# Patient Record
Sex: FEMALE | Race: WHITE | NOT HISPANIC OR LATINO | Employment: FULL TIME | URBAN - METROPOLITAN AREA
[De-identification: names, ages, dates, MRNs, and addresses within clinical notes are randomized per-mention and may not be internally consistent; named-entity substitution may affect disease eponyms.]

---

## 2017-04-19 ENCOUNTER — ALLSCRIPTS OFFICE VISIT (OUTPATIENT)
Dept: OTHER | Facility: OTHER | Age: 57
End: 2017-04-19

## 2018-01-14 VITALS
BODY MASS INDEX: 26.46 KG/M2 | HEART RATE: 63 BPM | DIASTOLIC BLOOD PRESSURE: 76 MMHG | WEIGHT: 155 LBS | SYSTOLIC BLOOD PRESSURE: 126 MMHG | HEIGHT: 64 IN

## 2018-03-24 DIAGNOSIS — I10 ESSENTIAL HYPERTENSION: Primary | ICD-10-CM

## 2018-03-24 RX ORDER — LISINOPRIL 10 MG/1
TABLET ORAL
Qty: 90 TABLET | Refills: 3 | Status: SHIPPED | OUTPATIENT
Start: 2018-03-24 | End: 2019-03-14 | Stop reason: SDUPTHER

## 2018-06-23 DIAGNOSIS — E78.00 HYPERCHOLESTEROLEMIA: Primary | ICD-10-CM

## 2018-06-23 RX ORDER — ATORVASTATIN CALCIUM 40 MG/1
TABLET, FILM COATED ORAL
Qty: 90 TABLET | Refills: 3 | Status: SHIPPED | OUTPATIENT
Start: 2018-06-23 | End: 2019-06-12 | Stop reason: SDUPTHER

## 2018-09-04 DIAGNOSIS — I10 ESSENTIAL HYPERTENSION: Primary | ICD-10-CM

## 2018-11-06 ENCOUNTER — TRANSCRIBE ORDERS (OUTPATIENT)
Dept: ADMINISTRATIVE | Facility: HOSPITAL | Age: 58
End: 2018-11-06

## 2018-11-06 DIAGNOSIS — N63.0 BREAST LUMP: Primary | ICD-10-CM

## 2018-11-06 DIAGNOSIS — Z12.39 SCREENING BREAST EXAMINATION: ICD-10-CM

## 2018-11-14 ENCOUNTER — HOSPITAL ENCOUNTER (OUTPATIENT)
Dept: MAMMOGRAPHY | Facility: HOSPITAL | Age: 58
Discharge: HOME | End: 2018-11-14
Payer: COMMERCIAL

## 2018-11-14 DIAGNOSIS — Z12.39 SCREENING BREAST EXAMINATION: ICD-10-CM

## 2018-11-14 DIAGNOSIS — N63.0 BREAST LUMP: ICD-10-CM

## 2018-11-14 PROCEDURE — 77066 DX MAMMO INCL CAD BI: CPT

## 2018-11-14 PROCEDURE — G0279 TOMOSYNTHESIS, MAMMO: HCPCS

## 2018-11-14 PROCEDURE — 77067 SCR MAMMO BI INCL CAD: CPT

## 2018-11-19 ENCOUNTER — HOSPITAL ENCOUNTER (OUTPATIENT)
Dept: ULTRASOUND IMAGING | Facility: HOSPITAL | Age: 58
Discharge: HOME | End: 2018-11-19
Payer: COMMERCIAL

## 2018-11-19 DIAGNOSIS — Z12.39 SCREENING BREAST EXAMINATION: ICD-10-CM

## 2018-11-19 PROCEDURE — 76642 ULTRASOUND BREAST LIMITED: CPT

## 2019-03-14 DIAGNOSIS — I10 ESSENTIAL HYPERTENSION: ICD-10-CM

## 2019-03-14 RX ORDER — LISINOPRIL 10 MG/1
TABLET ORAL
Qty: 90 TABLET | Refills: 3 | Status: SHIPPED | OUTPATIENT
Start: 2019-03-14 | End: 2020-03-18 | Stop reason: SDUPTHER

## 2019-06-12 DIAGNOSIS — E78.00 HYPERCHOLESTEROLEMIA: ICD-10-CM

## 2019-06-12 RX ORDER — ATORVASTATIN CALCIUM 40 MG/1
TABLET, FILM COATED ORAL
Qty: 90 TABLET | Refills: 3 | Status: SHIPPED | OUTPATIENT
Start: 2019-06-12 | End: 2021-03-29 | Stop reason: SDUPTHER

## 2019-08-27 DIAGNOSIS — I10 ESSENTIAL HYPERTENSION: ICD-10-CM

## 2020-01-28 ENCOUNTER — TRANSCRIBE ORDERS (OUTPATIENT)
Dept: ADMINISTRATIVE | Facility: HOSPITAL | Age: 60
End: 2020-01-28

## 2020-01-28 DIAGNOSIS — Z12.31 ENCOUNTER FOR SCREENING MAMMOGRAM FOR MALIGNANT NEOPLASM OF BREAST: ICD-10-CM

## 2020-01-28 DIAGNOSIS — N61.0 BREAST INFECTION: Primary | ICD-10-CM

## 2020-03-18 ENCOUNTER — OFFICE VISIT (OUTPATIENT)
Dept: CARDIOLOGY CLINIC | Facility: CLINIC | Age: 60
End: 2020-03-18
Payer: COMMERCIAL

## 2020-03-18 VITALS
SYSTOLIC BLOOD PRESSURE: 138 MMHG | WEIGHT: 144 LBS | DIASTOLIC BLOOD PRESSURE: 80 MMHG | BODY MASS INDEX: 24.59 KG/M2 | HEART RATE: 54 BPM | HEIGHT: 64 IN

## 2020-03-18 DIAGNOSIS — E78.00 HYPERCHOLESTEROLEMIA: ICD-10-CM

## 2020-03-18 DIAGNOSIS — I10 ESSENTIAL HYPERTENSION: Primary | ICD-10-CM

## 2020-03-18 DIAGNOSIS — I25.10 CORONARY ARTERY DISEASE INVOLVING NATIVE CORONARY ARTERY OF NATIVE HEART WITHOUT ANGINA PECTORIS: ICD-10-CM

## 2020-03-18 PROCEDURE — 99213 OFFICE O/P EST LOW 20 MIN: CPT | Performed by: INTERNAL MEDICINE

## 2020-03-18 PROCEDURE — 93000 ELECTROCARDIOGRAM COMPLETE: CPT | Performed by: INTERNAL MEDICINE

## 2020-03-18 RX ORDER — LISINOPRIL 10 MG/1
10 TABLET ORAL DAILY
Qty: 90 TABLET | Refills: 3 | Status: SHIPPED | OUTPATIENT
Start: 2020-03-18 | End: 2020-11-18 | Stop reason: DRUGHIGH

## 2020-03-18 RX ORDER — PREDNISONE 1 MG/1
5 TABLET ORAL DAILY
COMMUNITY
Start: 2020-02-27

## 2020-03-18 RX ORDER — ASPIRIN 325 MG
325 TABLET ORAL
COMMUNITY
Start: 2010-11-01 | End: 2020-11-18

## 2020-03-18 RX ORDER — FOLIC ACID 1 MG/1
1000 TABLET ORAL DAILY
COMMUNITY
Start: 2020-03-10 | End: 2020-11-18

## 2020-03-18 RX ORDER — METHOTREXATE 2.5 MG/1
TABLET ORAL
COMMUNITY
End: 2020-11-18

## 2020-03-18 RX ORDER — ALPRAZOLAM 1 MG/1
TABLET ORAL
COMMUNITY
Start: 2018-10-19

## 2020-03-18 RX ORDER — METOPROLOL SUCCINATE 25 MG/1
25 TABLET, EXTENDED RELEASE ORAL DAILY
Qty: 90 TABLET | Refills: 3 | Status: SHIPPED | OUTPATIENT
Start: 2020-03-18 | End: 2021-03-07

## 2020-03-18 NOTE — PROGRESS NOTES
Cardiology Follow Up    Ramya Leroy  1960  [id number]  [facility] [address] [address] [phone]    1  Essential hypertension  POCT ECG    lisinopril (ZESTRIL) 10 mg tablet    metoprolol succinate (TOPROL-XL) 25 mg 24 hr tablet   2  Coronary artery disease involving native coronary artery of native heart without angina pectoris  metoprolol succinate (TOPROL-XL) 25 mg 24 hr tablet   3  Hypercholesterolemia           Discussion/Summary: She is stable from a cardiac standpoint  I will decrease her ASA to 81 mg daily  I stressed to her that she needs to stop vaping as just stopping smoking is not enough  No cardiac testing is ordered  RTO 1 year  Will plan on ordering a regular stress test at that time  Interval History: I have not seen her in the office since 2017  She remains active and very busy  She denies CP, SOB, palpitations  She did stop smoking but is now vaping  She has CAD with prior RCA stenting in   LDL cholesterol is 70  Patient Active Problem List   Diagnosis    Coronary artery disease involving native coronary artery of native heart without angina pectoris    Hypercholesterolemia     No past medical history on file    Social History     Socioeconomic History    Marital status: /Civil Union     Spouse name: Not on file    Number of children: Not on file    Years of education: Not on file    Highest education level: Not on file   Occupational History    Not on file   Social Needs    Financial resource strain: Not on file    Food insecurity:     Worry: Not on file     Inability: Not on file    Transportation needs:     Medical: Not on file     Non-medical: Not on file   Tobacco Use    Smoking status: Former Smoker     Types: Cigarettes     Last attempt to quit: 2019     Years since quittin 9    Smokeless tobacco: Never Used    Tobacco comment: Now vaping   Substance and Sexual Activity    Alcohol use: Not on file    Drug use: Not on file    Sexual activity: Not on file   Lifestyle    Physical activity:     Days per week: Not on file     Minutes per session: Not on file    Stress: Not on file   Relationships    Social connections:     Talks on phone: Not on file     Gets together: Not on file     Attends Congregational service: Not on file     Active member of club or organization: Not on file     Attends meetings of clubs or organizations: Not on file     Relationship status: Not on file    Intimate partner violence:     Fear of current or ex partner: Not on file     Emotionally abused: Not on file     Physically abused: Not on file     Forced sexual activity: Not on file   Other Topics Concern    Not on file   Social History Narrative    Not on file      No family history on file  No past surgical history on file      Current Outpatient Medications:     ALPRAZolam (XANAX) 1 mg tablet, alprazolam 1 mg tablet  take 1 tablet by mouth at bedtime if needed, Disp: , Rfl:     aspirin 325 mg tablet, Take 325 mg by mouth, Disp: , Rfl:     atorvastatin (LIPITOR) 40 mg tablet, TAKE 1 TABLET DAILY AS DIRECTED , Disp: 90 tablet, Rfl: 3    Cholecalciferol 125 MCG (5000 UT) capsule, cholecalciferol (vitamin D3) 1,250 mcg (50,000 unit) capsule  take 1 capsule by mouth every week, Disp: , Rfl:     folic acid (FOLVITE) 1 mg tablet, Take 1,000 mcg by mouth daily, Disp: , Rfl:     lisinopril (ZESTRIL) 10 mg tablet, Take 1 tablet (10 mg total) by mouth daily, Disp: 90 tablet, Rfl: 3    methotrexate 2 5 MG tablet, methotrexate sodium 2 5 mg tablet  TAKE 5 TABLETS BY MOUTH ONCE WEEKLY , Disp: , Rfl:     metoprolol tartrate (LOPRESSOR) 25 mg tablet, TAKE 1 TABLET DAILY, Disp: 90 tablet, Rfl: 3    predniSONE 5 mg tablet, Take 5 mg by mouth daily, Disp: , Rfl:     metoprolol succinate (TOPROL-XL) 25 mg 24 hr tablet, Take 1 tablet (25 mg total) by mouth daily, Disp: 90 tablet, Rfl: 3  No Known Allergies  Vitals:    03/18/20 1403   BP: 138/80   BP Location: Right arm   Cuff Size: Standard   Pulse: (!) 54   Weight: 65 3 kg (144 lb)   Height: 5' 4" (1 626 m)     Weight (last 2 days)     Date/Time   Weight    03/18/20 1403   65 3 (144)             Blood pressure 138/80, pulse (!) 54, height 5' 4" (1 626 m), weight 65 3 kg (144 lb)  , Body mass index is 24 72 kg/m²  Labs:  No visits with results within 6 Month(s) from this visit  Latest known visit with results is:   No results found for any previous visit  Imaging: No results found  Review of Systems:  Review of Systems   Constitutional: Negative for diaphoresis, fatigue, fever and unexpected weight change  HENT: Negative  Respiratory: Negative for cough, shortness of breath and wheezing  Cardiovascular: Negative for chest pain, palpitations and leg swelling  Gastrointestinal: Negative for abdominal pain, diarrhea and nausea  Musculoskeletal: Negative for gait problem and myalgias  Skin: Negative for rash  Neurological: Negative for dizziness and numbness  Psychiatric/Behavioral: Negative  Physical Exam:  Physical Exam   Constitutional: She is oriented to person, place, and time  She appears well-developed and well-nourished  HENT:   Head: Normocephalic and atraumatic  Eyes: Pupils are equal, round, and reactive to light  Neck: Normal range of motion  Neck supple  No JVD present  Cardiovascular: Regular rhythm, S1 normal, S2 normal and normal pulses  Pulses:       Carotid pulses are 2+ on the right side, and 2+ on the left side  Pulmonary/Chest: Effort normal and breath sounds normal  She has no wheezes  She has no rales  Abdominal: Soft  Bowel sounds are normal  There is no tenderness  Musculoskeletal: Normal range of motion  She exhibits no edema or tenderness  Neurological: She is alert and oriented to person, place, and time  She has normal reflexes   No cranial nerve deficit  Skin: Skin is warm  Psychiatric: She has a normal mood and affect

## 2020-07-25 ENCOUNTER — APPOINTMENT (OUTPATIENT)
Dept: LAB | Facility: CLINIC | Age: 60
End: 2020-07-25
Payer: COMMERCIAL

## 2020-07-25 ENCOUNTER — TRANSCRIBE ORDERS (OUTPATIENT)
Dept: URGENT CARE | Facility: CLINIC | Age: 60
End: 2020-07-25

## 2020-07-25 DIAGNOSIS — E55.9 VITAMIN D DEFICIENCY: ICD-10-CM

## 2020-07-25 DIAGNOSIS — I10 ESSENTIAL HYPERTENSION: Primary | ICD-10-CM

## 2020-07-25 DIAGNOSIS — I10 ESSENTIAL HYPERTENSION: ICD-10-CM

## 2020-07-25 LAB
25(OH)D3 SERPL-MCNC: 26.8 NG/ML (ref 30–100)
ALBUMIN SERPL BCP-MCNC: 3.2 G/DL (ref 3.5–5)
ALP SERPL-CCNC: 95 U/L (ref 46–116)
ALT SERPL W P-5'-P-CCNC: 40 U/L (ref 12–78)
ANION GAP SERPL CALCULATED.3IONS-SCNC: 5 MMOL/L (ref 4–13)
AST SERPL W P-5'-P-CCNC: 26 U/L (ref 5–45)
BILIRUB SERPL-MCNC: 0.59 MG/DL (ref 0.2–1)
BUN SERPL-MCNC: 12 MG/DL (ref 5–25)
CALCIUM SERPL-MCNC: 9.2 MG/DL (ref 8.3–10.1)
CHLORIDE SERPL-SCNC: 107 MMOL/L (ref 100–108)
CO2 SERPL-SCNC: 29 MMOL/L (ref 21–32)
CREAT SERPL-MCNC: 0.74 MG/DL (ref 0.6–1.3)
GFR SERPL CREATININE-BSD FRML MDRD: 89 ML/MIN/1.73SQ M
GLUCOSE SERPL-MCNC: 72 MG/DL (ref 65–140)
POTASSIUM SERPL-SCNC: 3.6 MMOL/L (ref 3.5–5.3)
PROT SERPL-MCNC: 7.2 G/DL (ref 6.4–8.2)
SODIUM SERPL-SCNC: 141 MMOL/L (ref 136–145)

## 2020-07-25 PROCEDURE — 80053 COMPREHEN METABOLIC PANEL: CPT

## 2020-07-25 PROCEDURE — 82306 VITAMIN D 25 HYDROXY: CPT

## 2020-07-25 PROCEDURE — 36415 COLL VENOUS BLD VENIPUNCTURE: CPT

## 2020-11-18 ENCOUNTER — OFFICE VISIT (OUTPATIENT)
Dept: CARDIOLOGY CLINIC | Facility: CLINIC | Age: 60
End: 2020-11-18
Payer: COMMERCIAL

## 2020-11-18 VITALS
WEIGHT: 142 LBS | DIASTOLIC BLOOD PRESSURE: 80 MMHG | BODY MASS INDEX: 24.24 KG/M2 | TEMPERATURE: 96.8 F | SYSTOLIC BLOOD PRESSURE: 154 MMHG | HEIGHT: 64 IN | HEART RATE: 68 BPM

## 2020-11-18 DIAGNOSIS — I25.10 CORONARY ARTERY DISEASE INVOLVING NATIVE CORONARY ARTERY OF NATIVE HEART WITHOUT ANGINA PECTORIS: Primary | ICD-10-CM

## 2020-11-18 DIAGNOSIS — E78.00 HYPERCHOLESTEROLEMIA: ICD-10-CM

## 2020-11-18 DIAGNOSIS — R00.2 PALPITATIONS: ICD-10-CM

## 2020-11-18 DIAGNOSIS — I10 ESSENTIAL HYPERTENSION: ICD-10-CM

## 2020-11-18 PROCEDURE — 93000 ELECTROCARDIOGRAM COMPLETE: CPT | Performed by: INTERNAL MEDICINE

## 2020-11-18 PROCEDURE — 99214 OFFICE O/P EST MOD 30 MIN: CPT | Performed by: INTERNAL MEDICINE

## 2020-11-18 RX ORDER — ASPIRIN 81 MG/1
81 TABLET ORAL DAILY
COMMUNITY

## 2020-11-18 RX ORDER — LISINOPRIL 20 MG/1
20 TABLET ORAL DAILY
Qty: 90 TABLET | Refills: 4 | Status: SHIPPED | OUTPATIENT
Start: 2020-11-18

## 2020-11-25 ENCOUNTER — LAB (OUTPATIENT)
Dept: LAB | Facility: CLINIC | Age: 60
End: 2020-11-25
Payer: COMMERCIAL

## 2020-11-25 ENCOUNTER — TRANSCRIBE ORDERS (OUTPATIENT)
Dept: URGENT CARE | Facility: CLINIC | Age: 60
End: 2020-11-25

## 2020-11-25 DIAGNOSIS — I10 ESSENTIAL HYPERTENSION, MALIGNANT: ICD-10-CM

## 2020-11-25 DIAGNOSIS — E55.9 AVITAMINOSIS D: ICD-10-CM

## 2020-11-25 DIAGNOSIS — E55.9 VITAMIN D DEFICIENCY: Primary | ICD-10-CM

## 2020-11-25 DIAGNOSIS — I10 ESSENTIAL HYPERTENSION: ICD-10-CM

## 2020-11-25 LAB
25(OH)D3 SERPL-MCNC: 31.9 NG/ML (ref 30–100)
ALBUMIN SERPL BCP-MCNC: 3.3 G/DL (ref 3.5–5)
ALP SERPL-CCNC: 84 U/L (ref 46–116)
ALT SERPL W P-5'-P-CCNC: 34 U/L (ref 12–78)
ANION GAP SERPL CALCULATED.3IONS-SCNC: 3 MMOL/L (ref 4–13)
AST SERPL W P-5'-P-CCNC: 23 U/L (ref 5–45)
BILIRUB SERPL-MCNC: 0.32 MG/DL (ref 0.2–1)
BUN SERPL-MCNC: 9 MG/DL (ref 5–25)
CALCIUM ALBUM COR SERPL-MCNC: 9.6 MG/DL (ref 8.3–10.1)
CALCIUM SERPL-MCNC: 9 MG/DL (ref 8.3–10.1)
CHLORIDE SERPL-SCNC: 109 MMOL/L (ref 100–108)
CO2 SERPL-SCNC: 30 MMOL/L (ref 21–32)
CREAT SERPL-MCNC: 0.89 MG/DL (ref 0.6–1.3)
ERYTHROCYTE [DISTWIDTH] IN BLOOD BY AUTOMATED COUNT: 14.2 % (ref 11.6–15.1)
GFR SERPL CREATININE-BSD FRML MDRD: 71 ML/MIN/1.73SQ M
GLUCOSE SERPL-MCNC: 68 MG/DL (ref 65–140)
HCT VFR BLD AUTO: 39.1 % (ref 34.8–46.1)
HGB BLD-MCNC: 13.5 G/DL (ref 11.5–15.4)
MCH RBC QN AUTO: 35.1 PG (ref 26.8–34.3)
MCHC RBC AUTO-ENTMCNC: 34.5 G/DL (ref 31.4–37.4)
MCV RBC AUTO: 102 FL (ref 82–98)
PLATELET # BLD AUTO: 186 THOUSANDS/UL (ref 149–390)
PMV BLD AUTO: 14 FL (ref 8.9–12.7)
POTASSIUM SERPL-SCNC: 3.7 MMOL/L (ref 3.5–5.3)
PROT SERPL-MCNC: 7.1 G/DL (ref 6.4–8.2)
RBC # BLD AUTO: 3.85 MILLION/UL (ref 3.81–5.12)
SODIUM SERPL-SCNC: 142 MMOL/L (ref 136–145)
WBC # BLD AUTO: 5.08 THOUSAND/UL (ref 4.31–10.16)

## 2020-11-25 PROCEDURE — 85027 COMPLETE CBC AUTOMATED: CPT

## 2020-11-25 PROCEDURE — 82306 VITAMIN D 25 HYDROXY: CPT

## 2020-11-25 PROCEDURE — 36415 COLL VENOUS BLD VENIPUNCTURE: CPT

## 2020-11-25 PROCEDURE — 80053 COMPREHEN METABOLIC PANEL: CPT

## 2020-11-30 ENCOUNTER — HOSPITAL ENCOUNTER (OUTPATIENT)
Dept: NON INVASIVE DIAGNOSTICS | Facility: CLINIC | Age: 60
Discharge: HOME | End: 2020-11-30
Payer: COMMERCIAL

## 2020-11-30 DIAGNOSIS — I25.10 CORONARY ARTERY DISEASE INVOLVING NATIVE CORONARY ARTERY OF NATIVE HEART WITHOUT ANGINA PECTORIS: ICD-10-CM

## 2020-11-30 DIAGNOSIS — E78.00 HYPERCHOLESTEROLEMIA: ICD-10-CM

## 2020-11-30 DIAGNOSIS — R00.2 PALPITATIONS: ICD-10-CM

## 2020-11-30 PROCEDURE — 93226 XTRNL ECG REC<48 HR SCAN A/R: CPT

## 2020-11-30 PROCEDURE — 93018 CV STRESS TEST I&R ONLY: CPT | Performed by: INTERNAL MEDICINE

## 2020-11-30 PROCEDURE — 93016 CV STRESS TEST SUPVJ ONLY: CPT | Performed by: INTERNAL MEDICINE

## 2020-11-30 PROCEDURE — 93017 CV STRESS TEST TRACING ONLY: CPT

## 2020-11-30 PROCEDURE — 93225 XTRNL ECG REC<48 HRS REC: CPT

## 2020-12-03 PROCEDURE — 93227 XTRNL ECG REC<48 HR R&I: CPT | Performed by: INTERNAL MEDICINE

## 2020-12-08 ENCOUNTER — TELEPHONE (OUTPATIENT)
Dept: CARDIOLOGY CLINIC | Facility: CLINIC | Age: 60
End: 2020-12-08

## 2020-12-14 ENCOUNTER — TELEPHONE (OUTPATIENT)
Dept: CARDIOLOGY CLINIC | Facility: CLINIC | Age: 60
End: 2020-12-14

## 2021-03-07 DIAGNOSIS — I10 ESSENTIAL HYPERTENSION: ICD-10-CM

## 2021-03-07 DIAGNOSIS — I25.10 CORONARY ARTERY DISEASE INVOLVING NATIVE CORONARY ARTERY OF NATIVE HEART WITHOUT ANGINA PECTORIS: ICD-10-CM

## 2021-03-07 RX ORDER — METOPROLOL SUCCINATE 25 MG/1
TABLET, EXTENDED RELEASE ORAL
Qty: 90 TABLET | Refills: 3 | Status: SHIPPED | OUTPATIENT
Start: 2021-03-07 | End: 2022-02-21

## 2021-03-29 DIAGNOSIS — E78.00 HYPERCHOLESTEROLEMIA: ICD-10-CM

## 2021-03-29 RX ORDER — ATORVASTATIN CALCIUM 40 MG/1
40 TABLET, FILM COATED ORAL DAILY
Qty: 90 TABLET | Refills: 3 | Status: SHIPPED | OUTPATIENT
Start: 2021-03-29

## 2021-04-08 DIAGNOSIS — Z23 ENCOUNTER FOR IMMUNIZATION: ICD-10-CM

## 2021-06-03 ENCOUNTER — HOSPITAL ENCOUNTER (EMERGENCY)
Facility: HOSPITAL | Age: 61
Discharge: HOME | End: 2021-06-03
Attending: FAMILY MEDICINE | Admitting: FAMILY MEDICINE
Payer: COMMERCIAL

## 2021-06-03 ENCOUNTER — APPOINTMENT (EMERGENCY)
Dept: RADIOLOGY | Facility: HOSPITAL | Age: 61
End: 2021-06-03
Payer: COMMERCIAL

## 2021-06-03 VITALS
RESPIRATION RATE: 17 BRPM | WEIGHT: 135 LBS | TEMPERATURE: 99.2 F | OXYGEN SATURATION: 100 % | DIASTOLIC BLOOD PRESSURE: 77 MMHG | HEIGHT: 64 IN | SYSTOLIC BLOOD PRESSURE: 159 MMHG | HEART RATE: 58 BPM | BODY MASS INDEX: 23.05 KG/M2

## 2021-06-03 DIAGNOSIS — R07.9 CHEST PAIN: Primary | ICD-10-CM

## 2021-06-03 LAB
ANION GAP SERPL CALCULATED.3IONS-SCNC: 10 MMOL/L (ref 4–13)
BASOPHILS # BLD AUTO: 0 THOUSANDS/ΜL (ref 0–0.1)
BASOPHILS NFR BLD AUTO: 0 % (ref 0–2)
BUN SERPL-MCNC: 13 MG/DL (ref 7–25)
CALCIUM SERPL-MCNC: 8.8 MG/DL (ref 8.6–10.5)
CHLORIDE SERPL-SCNC: 104 MMOL/L (ref 98–107)
CO2 SERPL-SCNC: 28 MMOL/L (ref 21–31)
CREAT SERPL-MCNC: 0.93 MG/DL (ref 0.6–1.2)
EOSINOPHIL # BLD AUTO: 0.2 THOUSAND/ΜL (ref 0–0.61)
EOSINOPHIL NFR BLD AUTO: 3 % (ref 0–5)
ERYTHROCYTE [DISTWIDTH] IN BLOOD BY AUTOMATED COUNT: 13.8 % (ref 11.5–14.5)
GFR SERPL CREATININE-BSD FRML MDRD: 67 ML/MIN/1.73SQ M
GIANT PLATELETS BLD QL SMEAR: PRESENT
GLUCOSE SERPL-MCNC: 96 MG/DL (ref 65–99)
HCT VFR BLD AUTO: 37.5 % (ref 42–47)
HGB BLD-MCNC: 12.9 G/DL (ref 12–16)
LYMPHOCYTES # BLD AUTO: 1.1 THOUSANDS/ΜL (ref 0.6–4.47)
LYMPHOCYTES NFR BLD AUTO: 23 % (ref 21–51)
MCH RBC QN AUTO: 33.3 PG (ref 26–34)
MCHC RBC AUTO-ENTMCNC: 34.4 G/DL (ref 31–37)
MCV RBC AUTO: 97 FL (ref 81–99)
MONOCYTES # BLD AUTO: 0.2 THOUSAND/ΜL (ref 0.17–1.22)
MONOCYTES NFR BLD AUTO: 4 % (ref 2–12)
NEUTROPHILS # BLD AUTO: 3.4 THOUSANDS/ΜL (ref 1.4–6.5)
NEUTS SEG NFR BLD AUTO: 70 % (ref 42–75)
PLATELET # BLD AUTO: 179 THOUSANDS/UL (ref 149–390)
PLATELET BLD QL SMEAR: ADEQUATE
PMV BLD AUTO: 11.4 FL (ref 8.6–11.7)
POTASSIUM SERPL-SCNC: 3.5 MMOL/L (ref 3.5–5.5)
RBC # BLD AUTO: 3.87 MILLION/UL (ref 3.9–5.2)
RBC MORPH BLD: NORMAL
SODIUM SERPL-SCNC: 142 MMOL/L (ref 134–143)
TROPONIN I SERPL-MCNC: <0.03 NG/ML
WBC # BLD AUTO: 4.9 THOUSAND/UL (ref 4.8–10.8)

## 2021-06-03 PROCEDURE — 99285 EMERGENCY DEPT VISIT HI MDM: CPT

## 2021-06-03 PROCEDURE — 84484 ASSAY OF TROPONIN QUANT: CPT | Performed by: PHYSICIAN ASSISTANT

## 2021-06-03 PROCEDURE — 36415 COLL VENOUS BLD VENIPUNCTURE: CPT | Performed by: PHYSICIAN ASSISTANT

## 2021-06-03 PROCEDURE — 93005 ELECTROCARDIOGRAM TRACING: CPT

## 2021-06-03 PROCEDURE — 80048 BASIC METABOLIC PNL TOTAL CA: CPT | Performed by: PHYSICIAN ASSISTANT

## 2021-06-03 PROCEDURE — 85025 COMPLETE CBC W/AUTO DIFF WBC: CPT | Performed by: PHYSICIAN ASSISTANT

## 2021-06-03 PROCEDURE — 96374 THER/PROPH/DIAG INJ IV PUSH: CPT

## 2021-06-03 PROCEDURE — 99284 EMERGENCY DEPT VISIT MOD MDM: CPT | Performed by: PHYSICIAN ASSISTANT

## 2021-06-03 PROCEDURE — 71045 X-RAY EXAM CHEST 1 VIEW: CPT

## 2021-06-03 RX ORDER — SODIUM CHLORIDE 9 MG/ML
3 INJECTION INTRAVENOUS
Status: DISCONTINUED | OUTPATIENT
Start: 2021-06-03 | End: 2021-06-03 | Stop reason: HOSPADM

## 2021-06-03 RX ORDER — KETOROLAC TROMETHAMINE 30 MG/ML
30 INJECTION, SOLUTION INTRAMUSCULAR; INTRAVENOUS ONCE
Status: COMPLETED | OUTPATIENT
Start: 2021-06-03 | End: 2021-06-03

## 2021-06-03 RX ADMIN — KETOROLAC TROMETHAMINE 30 MG: 30 INJECTION, SOLUTION INTRAMUSCULAR at 18:17

## 2021-06-03 NOTE — ED PROVIDER NOTES
History  Chief Complaint   Patient presents with    Chest Pain     left sided chest / axilla pain started this AM while pt was asleep  pain woke pt from a sleep 2-3 times  pain described as stabbin pain that comes and goes every 10 minutes or so     59-year-old female presents to the emergency department complaining of left-sided parasternal chest pains that began this morning while the patient was asleep  She states they woke her from sleep approximately 2-3 times  She describes the pain as stabbing and comes every 10 minutes or so  She does not appear to be acutely distressed  She denies diaphoresis shortness of breath lightheadedness palpitations pain that radiates nausea vomiting weakness fatigue  History of coronary artery disease requiring stent placement  Pain is not aggravated or alleviated by movement or rest   Patient is pain-free at the time of evaluation  Allergies reviewed          Prior to Admission Medications   Prescriptions Last Dose Informant Patient Reported? Taking?    ALPRAZolam (XANAX) 1 mg tablet 6/3/2021 at Unknown time Self Yes Yes   Sig: alprazolam 1 mg tablet   take 1 tablet by mouth at bedtime if needed   Cholecalciferol 125 MCG (5000 UT) capsule 6/3/2021 at Unknown time Self Yes Yes   Sig: cholecalciferol (vitamin D3) 1,250 mcg (50,000 unit) capsule   take 1 capsule by mouth every week   aspirin (ECOTRIN LOW STRENGTH) 81 mg EC tablet 6/3/2021 at Unknown time  Yes Yes   Sig: Take 81 mg by mouth daily   atorvastatin (LIPITOR) 40 mg tablet 6/3/2021 at Unknown time  No Yes   Sig: Take 1 tablet (40 mg total) by mouth daily   lisinopril (ZESTRIL) 20 mg tablet 6/3/2021 at Unknown time  No Yes   Sig: Take 1 tablet (20 mg total) by mouth daily   metoprolol succinate (TOPROL-XL) 25 mg 24 hr tablet 6/3/2021 at Unknown time  No Yes   Sig: TAKE 1 TABLET BY MOUTH EVERY DAY   predniSONE 5 mg tablet 6/3/2021 at Unknown time Self Yes Yes   Sig: Take 5 mg by mouth daily Facility-Administered Medications: None       Past Medical History:   Diagnosis Date    Arthritis     Coronary artery disease     Hypercholesteremia     Hypertension        Past Surgical History:   Procedure Laterality Date    CORONARY STENT PLACEMENT  2002       History reviewed  No pertinent family history  I have reviewed and agree with the history as documented  E-Cigarette/Vaping     E-Cigarette/Vaping Substances    Nicotine Yes     Flavoring Yes      Social History     Tobacco Use    Smoking status: Former Smoker     Types: Cigarettes     Quit date: 2019     Years since quittin 1    Smokeless tobacco: Never Used    Tobacco comment: Now vaping   Substance Use Topics    Alcohol use: Yes     Comment: socially    Drug use: Never       Review of Systems   Constitutional: Negative for chills, fatigue and fever  HENT: Negative for congestion, ear pain, rhinorrhea, sinus pressure, sneezing, sore throat and trouble swallowing  Eyes: Negative for discharge and itching  Respiratory: Negative for cough, chest tightness, shortness of breath, wheezing and stridor  Cardiovascular: Positive for chest pain  Negative for palpitations  Gastrointestinal: Negative for abdominal pain, diarrhea, nausea and vomiting  Neurological: Negative for dizziness, syncope, numbness and headaches  All other systems reviewed and are negative  Physical Exam  Physical Exam  Vitals signs and nursing note reviewed  Constitutional:       General: She is not in acute distress  Appearance: She is well-developed  She is not diaphoretic  HENT:      Head: Normocephalic and atraumatic  Right Ear: External ear normal       Left Ear: External ear normal       Nose: Nose normal    Eyes:      Conjunctiva/sclera: Conjunctivae normal       Pupils: Pupils are equal, round, and reactive to light  Neck:      Musculoskeletal: Normal range of motion     Cardiovascular:      Rate and Rhythm: Normal rate and regular rhythm  Heart sounds: Normal heart sounds  No murmur  No friction rub  No gallop  Pulmonary:      Effort: Pulmonary effort is normal  No respiratory distress  Breath sounds: Normal breath sounds  No stridor  No wheezing or rales  Abdominal:      General: Bowel sounds are normal  There is no distension  Palpations: Abdomen is soft  Tenderness: There is no abdominal tenderness  There is no guarding  Musculoskeletal: Normal range of motion  General: No tenderness  Skin:     General: Skin is warm  Capillary Refill: Capillary refill takes less than 2 seconds  Neurological:      Mental Status: She is alert and oriented to person, place, and time           Vital Signs  ED Triage Vitals   Temperature Pulse Respirations Blood Pressure SpO2   06/03/21 1630 06/03/21 1631 06/03/21 1631 06/03/21 1631 06/03/21 1631   99 2 °F (37 3 °C) 63 18 159/77 100 %      Temp Source Heart Rate Source Patient Position - Orthostatic VS BP Location FiO2 (%)   06/03/21 1630 -- -- -- --   Temporal          Pain Score       06/03/21 1630       5           Vitals:    06/03/21 1631 06/03/21 1800   BP: 159/77    Pulse: 63 58         Visual Acuity      ED Medications  Medications   sodium chloride (PF) 0 9 % injection 3 mL (has no administration in time range)   ketorolac (TORADOL) injection 30 mg (30 mg Intravenous Given 6/3/21 1817)       Diagnostic Studies  Results Reviewed     Procedure Component Value Units Date/Time    Troponin I repeat in 3hrs [[id number]]     Lab Status: No result Specimen: Blood     Troponin I [[id number]]  (Normal) Collected: 06/03/21 1701    Lab Status: Final result Specimen: Blood from Arm, Right Updated: 06/03/21 1741     Troponin I <0 03 ng/mL     Basic metabolic panel [[id number]] Collected: 06/03/21 1701    Lab Status: Final result Specimen: Blood from Arm, Right Updated: 06/03/21 1736     Sodium 142 mmol/L      Potassium 3 5 mmol/L      Chloride 104 mmol/L      CO2 28 mmol/L      ANION GAP 10 mmol/L      BUN 13 mg/dL      Creatinine 0 93 mg/dL      Glucose 96 mg/dL      Calcium 8 8 mg/dL      eGFR 67 ml/min/1 73sq m     Narrative:      [place] guidelines for Chronic Kidney Disease (CKD):     Stage 1 with normal or high GFR (GFR > 90 mL/min/1 73 square meters)    Stage 2 Mild CKD (GFR = 60-89 mL/min/1 73 square meters)    Stage 3A Moderate CKD (GFR = 45-59 mL/min/1 73 square meters)    Stage 3B Moderate CKD (GFR = 30-44 mL/min/1 73 square meters)    Stage 4 Severe CKD (GFR = 15-29 mL/min/1 73 square meters)    Stage 5 End Stage CKD (GFR <15 mL/min/1 73 square meters)  Note: GFR calculation is accurate only with a steady state creatinine    Smear Review(Phlebs Do Not Order) [[id number]] Collected: 06/03/21 1701    Lab Status: Final result Specimen: Blood from Arm, Right Updated: 06/03/21 1734     RBC Morphology Normal     Platelet Estimate Adequate     Giant PLTs Present    CBC and differential [[id number]]  (Abnormal) Collected: 06/03/21 1701    Lab Status: Final result Specimen: Blood from Arm, Right Updated: 06/03/21 1728     WBC 4 90 Thousand/uL      RBC 3 87 Million/uL      Hemoglobin 12 9 g/dL      Hematocrit 37 5 %      MCV 97 fL      MCH 33 3 pg      MCHC 34 4 g/dL      RDW 13 8 %      MPV 11 4 fL      Platelets 052 Thousands/uL      Neutrophils Relative 70 %      Lymphocytes Relative 23 %      Monocytes Relative 4 %      Eosinophils Relative 3 %      Basophils Relative 0 %      Neutrophils Absolute 3 40 Thousands/µL      Lymphocytes Absolute 1 10 Thousands/µL      Monocytes Absolute 0 20 Thousand/µL      Eosinophils Absolute 0 20 Thousand/µL      Basophils Absolute 0 00 Thousands/µL                  X-ray chest 1 view portable    (Results Pending)              Procedures  Procedures         ED Course             HEART Risk Score      Most Recent Value   Heart Score Risk Calculator   History  0 Filed at: 06/03/2021 1743   ECG  1 Filed at: 06/03/2021 1743   Age  1 Filed at: 06/03/2021 1743   Risk Factors  2 Filed at: 06/03/2021 1743   Troponin  0 Filed at: 06/03/2021 1743   HEART Score  4 Filed at: 06/03/2021 1743                                    [facility]  Number of Diagnoses or Management Options  Chest pain:   Diagnosis management comments: Benign physical examination  Initial labs are unremarkable  Nonspecific T-wave EKG  Heart score 4  Patient offered admission which she adamantly declined  Patient also adamantly declined to wait for delta troponin  Patient electing to sign out of the emergency department against medical advice  Strongly encouraged the patient to wait for complete workup however she declines  AMA paperwork was completed  I sent a tiger text message to the patient's cardiologist notifying him that she was seen in the ER for chest pain and she left prior to completion of evaluation  Patient verbalized understanding AMA process  I explained during clear language that with a limited evaluation she may still have underlying cardiac process not seen on initial evaluation  She signed [place] paperwork           Amount and/or Complexity of Data Reviewed  Clinical lab tests: ordered and reviewed  Tests in the radiology section of CPT®: reviewed and ordered    Risk of Complications, Morbidity, and/or Mortality  Presenting problems: moderate  Diagnostic procedures: low  Management options: low    Patient Progress  Patient progress: stable      Disposition  Final diagnoses:   Chest pain     Time reflects when diagnosis was documented in both MDM as applicable and the Disposition within this note     Time User Action Codes Description Comment    6/3/2021  5:59 PM Iliana Guajardo Add [R07 9] Chest pain       ED Disposition     ED Disposition Condition Date/Time Comment    FLAKO Ramirez Gurvinder 3, 2021  6:21 PM Date: 6/3/2021  Patient: Denia Mccall  Admitted: 6/3/2021  4:29 PM  Attending Provider: MD Denia Villareal or her authorized caregiver has made the decision for the patient to leave the emergency department against the advice of [facility] emergency department staff  She or her authorized caregiver has been informed and understands the inherent risks, including death, loss of quality of life, permanent disability, cardiac failure or damage, worsening pain  She or her authorized care giver has decided to accept the responsibility for this decision  [place] [facility] and all necessary parties have been advised that she may return for further evaluation or treatment  Her condition at time of discharge was stable  [place] [facility]ner had  current vital signs as follows:  /77   Pulse 63   Temp 99 2 °F (37 3 °C) (Temporal)   Resp 18   Ht 5' 4" (1 626 m)   Wt 61 2 kg (135 lb)         Follow-up Information     Follow up With Specialties Details Why Contact Info    Maribel Workman MD Cardiology   [place] [id number]  [phone]            Discharge Medication List as of 6/3/2021  6:21 PM      CONTINUE these medications which have NOT CHANGED    Details   ALPRAZolam (XANAX) 1 mg tablet alprazolam 1 mg tablet   take 1 tablet by mouth at bedtime if needed, Historical Med      aspirin (ECOTRIN LOW STRENGTH) 81 mg EC tablet Take 81 mg by mouth daily, Historical Med      atorvastatin (LIPITOR) 40 mg tablet Take 1 tablet (40 mg total) by mouth daily, Starting Mon 3/29/2021, Normal      Cholecalciferol 125 MCG (5000 UT) capsule cholecalciferol (vitamin D3) 1,250 mcg (50,000 unit) capsule   take 1 capsule by mouth every week, Historical Med      lisinopril (ZESTRIL) 20 mg tablet Take 1 tablet (20 mg total) by mouth daily, Starting Wed 11/18/2020, Normal      metoprolol succinate (TOPROL-XL) 25 mg 24 hr tablet TAKE 1 TABLET BY MOUTH EVERY DAY, Normal      predniSONE 5 mg tablet Take 5 mg by mouth daily, Starting Thu 2/27/2020, Historical Med           No discharge procedures on file      PDMP Review     None          ED Provider  Electronically Signed by           Allison Burgos PA-C  06/03/21 1933       Allison Burgos PA-C  06/03/21 1937

## 2021-06-04 LAB
ATRIAL RATE: 58 BPM
ATRIAL RATE: 59 BPM
P AXIS: 49 DEGREES
P AXIS: 56 DEGREES
PR INTERVAL: 170 MS
PR INTERVAL: 176 MS
QRS AXIS: 15 DEGREES
QRS AXIS: 2 DEGREES
QRSD INTERVAL: 88 MS
QRSD INTERVAL: 96 MS
QT INTERVAL: 430 MS
QT INTERVAL: 454 MS
QTC INTERVAL: 422 MS
QTC INTERVAL: 449 MS
T WAVE AXIS: -32 DEGREES
T WAVE AXIS: -6 DEGREES
VENTRICULAR RATE: 58 BPM
VENTRICULAR RATE: 59 BPM

## 2021-06-04 PROCEDURE — 93010 ELECTROCARDIOGRAM REPORT: CPT | Performed by: INTERNAL MEDICINE

## 2021-06-10 ENCOUNTER — TRANSCRIBE ORDERS (OUTPATIENT)
Dept: URGENT CARE | Facility: CLINIC | Age: 61
End: 2021-06-10

## 2021-06-10 ENCOUNTER — APPOINTMENT (OUTPATIENT)
Dept: LAB | Facility: CLINIC | Age: 61
End: 2021-06-10
Payer: COMMERCIAL

## 2021-06-10 DIAGNOSIS — I10 ESSENTIAL HYPERTENSION, BENIGN: ICD-10-CM

## 2021-06-10 DIAGNOSIS — E78.2 MIXED HYPERLIPIDEMIA: ICD-10-CM

## 2021-06-10 DIAGNOSIS — I10 ESSENTIAL HYPERTENSION, BENIGN: Primary | ICD-10-CM

## 2021-06-10 LAB
ALBUMIN SERPL BCP-MCNC: 3.3 G/DL (ref 3.5–5)
ALP SERPL-CCNC: 90 U/L (ref 46–116)
ALT SERPL W P-5'-P-CCNC: 36 U/L (ref 12–78)
ANION GAP SERPL CALCULATED.3IONS-SCNC: 3 MMOL/L (ref 4–13)
AST SERPL W P-5'-P-CCNC: 31 U/L (ref 5–45)
BILIRUB SERPL-MCNC: 0.44 MG/DL (ref 0.2–1)
BUN SERPL-MCNC: 11 MG/DL (ref 5–25)
CALCIUM ALBUM COR SERPL-MCNC: 9.7 MG/DL (ref 8.3–10.1)
CALCIUM SERPL-MCNC: 9.1 MG/DL (ref 8.3–10.1)
CHLORIDE SERPL-SCNC: 106 MMOL/L (ref 100–108)
CHOLEST SERPL-MCNC: 174 MG/DL (ref 50–200)
CO2 SERPL-SCNC: 31 MMOL/L (ref 21–32)
CREAT SERPL-MCNC: 0.82 MG/DL (ref 0.6–1.3)
ERYTHROCYTE [DISTWIDTH] IN BLOOD BY AUTOMATED COUNT: 14.5 % (ref 11.6–15.1)
GFR SERPL CREATININE-BSD FRML MDRD: 78 ML/MIN/1.73SQ M
GLUCOSE P FAST SERPL-MCNC: 73 MG/DL (ref 65–99)
HCT VFR BLD AUTO: 39.2 % (ref 34.8–46.1)
HDLC SERPL-MCNC: 58 MG/DL
HGB BLD-MCNC: 13.3 G/DL (ref 11.5–15.4)
LDLC SERPL CALC-MCNC: 85 MG/DL (ref 0–100)
MCH RBC QN AUTO: 34.3 PG (ref 26.8–34.3)
MCHC RBC AUTO-ENTMCNC: 33.9 G/DL (ref 31.4–37.4)
MCV RBC AUTO: 101 FL (ref 82–98)
NONHDLC SERPL-MCNC: 116 MG/DL
PLATELET # BLD AUTO: 185 THOUSANDS/UL (ref 149–390)
PMV BLD AUTO: 13.8 FL (ref 8.9–12.7)
POTASSIUM SERPL-SCNC: 3.9 MMOL/L (ref 3.5–5.3)
PROT SERPL-MCNC: 7.3 G/DL (ref 6.4–8.2)
RBC # BLD AUTO: 3.88 MILLION/UL (ref 3.81–5.12)
SODIUM SERPL-SCNC: 140 MMOL/L (ref 136–145)
TRIGL SERPL-MCNC: 157 MG/DL
WBC # BLD AUTO: 5.67 THOUSAND/UL (ref 4.31–10.16)

## 2021-06-10 PROCEDURE — 36415 COLL VENOUS BLD VENIPUNCTURE: CPT

## 2021-06-10 PROCEDURE — 85027 COMPLETE CBC AUTOMATED: CPT

## 2021-06-10 PROCEDURE — 80053 COMPREHEN METABOLIC PANEL: CPT

## 2021-06-10 PROCEDURE — 80061 LIPID PANEL: CPT

## 2021-06-22 ENCOUNTER — OFFICE VISIT (OUTPATIENT)
Dept: URGENT CARE | Facility: CLINIC | Age: 61
End: 2021-06-22
Payer: COMMERCIAL

## 2021-06-22 VITALS
TEMPERATURE: 97 F | HEIGHT: 64 IN | DIASTOLIC BLOOD PRESSURE: 75 MMHG | HEART RATE: 64 BPM | WEIGHT: 140 LBS | BODY MASS INDEX: 23.9 KG/M2 | OXYGEN SATURATION: 99 % | RESPIRATION RATE: 18 BRPM | SYSTOLIC BLOOD PRESSURE: 128 MMHG

## 2021-06-22 DIAGNOSIS — L03.114 CELLULITIS OF LEFT ELBOW: ICD-10-CM

## 2021-06-22 DIAGNOSIS — M25.422 SWELLING OF LEFT ELBOW: Primary | ICD-10-CM

## 2021-06-22 PROCEDURE — 99203 OFFICE O/P NEW LOW 30 MIN: CPT | Performed by: PHYSICIAN ASSISTANT

## 2021-06-22 RX ORDER — PREDNISONE 10 MG/1
TABLET ORAL
Qty: 18 TABLET | Refills: 0 | Status: SHIPPED | OUTPATIENT
Start: 2021-06-22

## 2021-06-22 RX ORDER — CEPHALEXIN 500 MG/1
500 CAPSULE ORAL EVERY 6 HOURS SCHEDULED
Qty: 28 CAPSULE | Refills: 0 | Status: SHIPPED | OUTPATIENT
Start: 2021-06-22 | End: 2021-06-29

## 2021-06-22 NOTE — PROGRESS NOTES
[facility]        NAME: Denice Carpenter is a 62 y o  female  : 1960    MRN: [id number]  DATE: 2021  TIME: 1:49 PM    Assessment and Plan   Swelling of left elbow [M25 422]  1  Swelling of left elbow  cephalexin (KEFLEX) 500 mg capsule    predniSONE 10 mg tablet   2  Cellulitis of left elbow  cephalexin (KEFLEX) 500 mg capsule    predniSONE 10 mg tablet         Patient Instructions     Patient Instructions     Appears to be insect bite  She has some itching but very rapid swelling and redness concern for infection  Will cover with cephalexin and prednisone taper  She should use the prednisone taper and then can go back to the 5 mg daily dosing she is on for arthritis  If worsening symptoms on antibiotics go to the emergency room  Follow up with PCP in 3-5 days  Proceed to  ER if symptoms worsen  Chief Complaint     Chief Complaint   Patient presents with    Cellulitis     Patient presents with left elbow redness and swelling after working in yard yesterday  History of Present Illness         81-year-old female presents with right upper arm and left upper arm and left elbow redness swelling and itching and pain  She was outside working in the [place] she does not recall any bites or stings  Worsening redness today  She tried hydrocortisone cream and Benadryl topical ointment  No fever  No trouble swallowing or breathing  Review of Systems   Review of Systems   Constitutional: Negative  HENT: Negative  Eyes: Negative  Respiratory: Negative  Cardiovascular: Negative  Gastrointestinal: Negative  Skin: Positive for rash           Current Medications       Current Outpatient Medications:     ALPRAZolam (XANAX) 1 mg tablet, alprazolam 1 mg tablet  take 1 tablet by mouth at bedtime if needed, Disp: , Rfl:     aspirin (ECOTRIN LOW STRENGTH) 81 mg EC tablet, Take 81 mg by mouth daily, Disp: , Rfl:     atorvastatin (LIPITOR) 40 mg tablet, Take 1 tablet (40 mg total) by mouth daily, Disp: 90 tablet, Rfl: 3    Cholecalciferol 125 MCG (5000 UT) capsule, cholecalciferol (vitamin D3) 1,250 mcg (50,000 unit) capsule  take 1 capsule by mouth every week, Disp: , Rfl:     lisinopril (ZESTRIL) 20 mg tablet, Take 1 tablet (20 mg total) by mouth daily, Disp: 90 tablet, Rfl: 4    metoprolol succinate (TOPROL-XL) 25 mg 24 hr tablet, TAKE 1 TABLET BY MOUTH EVERY DAY, Disp: 90 tablet, Rfl: 3    predniSONE 5 mg tablet, Take 5 mg by mouth daily, Disp: , Rfl:     cephalexin (KEFLEX) 500 mg capsule, Take 1 capsule (500 mg total) by mouth every 6 (six) hours for 7 days, Disp: 28 capsule, Rfl: 0    predniSONE 10 mg tablet, 3 tabs daily for 3 days, 2 tabs daily for 3 days, 1 tab daily for 3 days, Disp: 18 tablet, Rfl: 0    Current Allergies     Allergies as of 06/22/2021    (No Known Allergies)            The following portions of the patient's history were reviewed and updated as appropriate: allergies, current medications, past family history, past medical history, past social history, past surgical history and problem list      Past Medical History:   Diagnosis Date    Arthritis     Coronary artery disease     Hypercholesteremia     Hypertension        Past Surgical History:   Procedure Laterality Date    CORONARY STENT PLACEMENT  2002       No family history on file  Medications have been verified  Objective   /75   Pulse 64   Temp (!) 97 °F (36 1 °C) (Temporal)   Resp 18   Ht 5' 4" (1 626 m)   Wt 63 5 kg (140 lb)   SpO2 99%   BMI 24 03 kg/m²        Physical Exam     Physical Exam  Constitutional:       General: She is not in acute distress  Appearance: She is well-developed  HENT:      Head: Normocephalic and atraumatic  Eyes:      Conjunctiva/sclera: Conjunctivae normal       Pupils: Pupils are equal, round, and reactive to light  Cardiovascular:      Rate and Rhythm: Normal rate and regular rhythm     Pulmonary:      Effort: Pulmonary effort is normal  No respiratory distress  Breath sounds: Normal breath sounds  Musculoskeletal:      Cervical back: Normal range of motion  Comments: Left elbow full range of motion  Right shoulder full range of motion  She has no swelling over the   Olecranon  bursa  Skin:     General: Skin is warm and dry  Comments: Right upper arm medial aspect she has a 3 cm area of erythema induration warmth and tenderness  There is some edema there  No lesion or bite visible  Left distal upper posterior arm over the triceps she has erythema extending in a 6 cm  This is indurated warm and tender  There is no bite  She has some edema extending down to the elbow not involving the olecranon bursa  Neurological:      Mental Status: She is alert and oriented to person, place, and time

## 2021-06-22 NOTE — PATIENT INSTRUCTIONS
Appears to be insect bite  She has some itching but very rapid swelling and redness concern for infection  Will cover with cephalexin and prednisone taper  She should use the prednisone taper and then can go back to the 5 mg daily dosing she is on for arthritis  If worsening symptoms on antibiotics go to the emergency room

## 2021-09-20 ENCOUNTER — APPOINTMENT (OUTPATIENT)
Dept: LAB | Facility: CLINIC | Age: 61
End: 2021-09-20
Payer: COMMERCIAL

## 2021-09-20 DIAGNOSIS — E55.9 VITAMIN D DEFICIENCY: ICD-10-CM

## 2021-09-20 DIAGNOSIS — I10 HYPERTENSION, UNSPECIFIED TYPE: ICD-10-CM

## 2021-09-20 LAB
25(OH)D3 SERPL-MCNC: 21.5 NG/ML (ref 30–100)
ALBUMIN SERPL BCP-MCNC: 3.1 G/DL (ref 3.5–5)
ALP SERPL-CCNC: 88 U/L (ref 46–116)
ALT SERPL W P-5'-P-CCNC: 35 U/L (ref 12–78)
ANION GAP SERPL CALCULATED.3IONS-SCNC: 3 MMOL/L (ref 4–13)
AST SERPL W P-5'-P-CCNC: 24 U/L (ref 5–45)
BASOPHILS # BLD AUTO: 0.01 THOUSANDS/ΜL (ref 0–0.1)
BASOPHILS NFR BLD AUTO: 0 % (ref 0–1)
BILIRUB SERPL-MCNC: 0.28 MG/DL (ref 0.2–1)
BUN SERPL-MCNC: 10 MG/DL (ref 5–25)
CALCIUM ALBUM COR SERPL-MCNC: 9.3 MG/DL (ref 8.3–10.1)
CALCIUM SERPL-MCNC: 8.6 MG/DL (ref 8.3–10.1)
CHLORIDE SERPL-SCNC: 109 MMOL/L (ref 100–108)
CO2 SERPL-SCNC: 29 MMOL/L (ref 21–32)
CREAT SERPL-MCNC: 0.76 MG/DL (ref 0.6–1.3)
EOSINOPHIL # BLD AUTO: 0.21 THOUSAND/ΜL (ref 0–0.61)
EOSINOPHIL NFR BLD AUTO: 4 % (ref 0–6)
ERYTHROCYTE [DISTWIDTH] IN BLOOD BY AUTOMATED COUNT: 14.6 % (ref 11.6–15.1)
GFR SERPL CREATININE-BSD FRML MDRD: 86 ML/MIN/1.73SQ M
GLUCOSE P FAST SERPL-MCNC: 76 MG/DL (ref 65–99)
HCT VFR BLD AUTO: 39.4 % (ref 34.8–46.1)
HGB BLD-MCNC: 13.8 G/DL (ref 11.5–15.4)
IMM GRANULOCYTES # BLD AUTO: 0.02 THOUSAND/UL (ref 0–0.2)
IMM GRANULOCYTES NFR BLD AUTO: 0 % (ref 0–2)
LYMPHOCYTES # BLD AUTO: 1.95 THOUSANDS/ΜL (ref 0.6–4.47)
LYMPHOCYTES NFR BLD AUTO: 41 % (ref 14–44)
MCH RBC QN AUTO: 35.5 PG (ref 26.8–34.3)
MCHC RBC AUTO-ENTMCNC: 35 G/DL (ref 31.4–37.4)
MCV RBC AUTO: 101 FL (ref 82–98)
MONOCYTES # BLD AUTO: 0.24 THOUSAND/ΜL (ref 0.17–1.22)
MONOCYTES NFR BLD AUTO: 5 % (ref 4–12)
NEUTROPHILS # BLD AUTO: 2.39 THOUSANDS/ΜL (ref 1.85–7.62)
NEUTS SEG NFR BLD AUTO: 50 % (ref 43–75)
NRBC BLD AUTO-RTO: 0 /100 WBCS
PLATELET # BLD AUTO: 184 THOUSANDS/UL (ref 149–390)
PMV BLD AUTO: 14.1 FL (ref 8.9–12.7)
POTASSIUM SERPL-SCNC: 3.6 MMOL/L (ref 3.5–5.3)
PROT SERPL-MCNC: 7.2 G/DL (ref 6.4–8.2)
RBC # BLD AUTO: 3.89 MILLION/UL (ref 3.81–5.12)
SODIUM SERPL-SCNC: 141 MMOL/L (ref 136–145)
WBC # BLD AUTO: 4.82 THOUSAND/UL (ref 4.31–10.16)

## 2021-09-20 PROCEDURE — 36415 COLL VENOUS BLD VENIPUNCTURE: CPT

## 2021-09-20 PROCEDURE — 85025 COMPLETE CBC W/AUTO DIFF WBC: CPT

## 2021-09-20 PROCEDURE — 82306 VITAMIN D 25 HYDROXY: CPT

## 2021-09-20 PROCEDURE — 80053 COMPREHEN METABOLIC PANEL: CPT

## 2021-11-22 ENCOUNTER — APPOINTMENT (OUTPATIENT)
Dept: LAB | Facility: CLINIC | Age: 61
End: 2021-11-22
Payer: COMMERCIAL

## 2021-11-22 DIAGNOSIS — E55.9 VITAMIN D DEFICIENCY: ICD-10-CM

## 2021-11-22 LAB — 25(OH)D3 SERPL-MCNC: 48.2 NG/ML (ref 30–100)

## 2021-11-22 PROCEDURE — 36415 COLL VENOUS BLD VENIPUNCTURE: CPT

## 2021-11-22 PROCEDURE — 82306 VITAMIN D 25 HYDROXY: CPT

## 2021-12-01 ENCOUNTER — OFFICE VISIT (OUTPATIENT)
Dept: CARDIOLOGY CLINIC | Facility: CLINIC | Age: 61
End: 2021-12-01
Payer: COMMERCIAL

## 2021-12-01 VITALS
DIASTOLIC BLOOD PRESSURE: 90 MMHG | WEIGHT: 135.4 LBS | SYSTOLIC BLOOD PRESSURE: 148 MMHG | HEART RATE: 59 BPM | HEIGHT: 64 IN | OXYGEN SATURATION: 99 % | BODY MASS INDEX: 23.12 KG/M2

## 2021-12-01 DIAGNOSIS — E78.00 HYPERCHOLESTEROLEMIA: ICD-10-CM

## 2021-12-01 DIAGNOSIS — I10 ESSENTIAL HYPERTENSION: ICD-10-CM

## 2021-12-01 DIAGNOSIS — I25.10 CORONARY ARTERY DISEASE INVOLVING NATIVE CORONARY ARTERY OF NATIVE HEART WITHOUT ANGINA PECTORIS: Primary | ICD-10-CM

## 2021-12-01 PROCEDURE — 99213 OFFICE O/P EST LOW 20 MIN: CPT | Performed by: INTERNAL MEDICINE

## 2021-12-01 RX ORDER — LISINOPRIL AND HYDROCHLOROTHIAZIDE 20; 12.5 MG/1; MG/1
TABLET ORAL
COMMUNITY
Start: 2021-11-23 | End: 2021-12-01 | Stop reason: DRUGHIGH

## 2021-12-01 RX ORDER — LISINOPRIL AND HYDROCHLOROTHIAZIDE 20; 12.5 MG/1; MG/1
2 TABLET ORAL DAILY
Qty: 180 TABLET | Refills: 4 | Status: SHIPPED | OUTPATIENT
Start: 2021-12-01

## 2022-01-24 ENCOUNTER — APPOINTMENT (OUTPATIENT)
Dept: LAB | Facility: CLINIC | Age: 62
End: 2022-01-24
Payer: COMMERCIAL

## 2022-01-24 DIAGNOSIS — I10 HYPERTENSION, UNSPECIFIED TYPE: ICD-10-CM

## 2022-01-24 DIAGNOSIS — E78.5 HYPERLIPIDEMIA, UNSPECIFIED HYPERLIPIDEMIA TYPE: ICD-10-CM

## 2022-01-24 LAB
ALBUMIN SERPL BCP-MCNC: 3.3 G/DL (ref 3.5–5)
ALP SERPL-CCNC: 89 U/L (ref 46–116)
ALT SERPL W P-5'-P-CCNC: 27 U/L (ref 12–78)
ANION GAP SERPL CALCULATED.3IONS-SCNC: 3 MMOL/L (ref 4–13)
AST SERPL W P-5'-P-CCNC: 20 U/L (ref 5–45)
BILIRUB SERPL-MCNC: 1.21 MG/DL (ref 0.2–1)
BUN SERPL-MCNC: 10 MG/DL (ref 5–25)
CALCIUM ALBUM COR SERPL-MCNC: 9.9 MG/DL (ref 8.3–10.1)
CALCIUM SERPL-MCNC: 9.3 MG/DL (ref 8.3–10.1)
CHLORIDE SERPL-SCNC: 102 MMOL/L (ref 100–108)
CO2 SERPL-SCNC: 32 MMOL/L (ref 21–32)
CREAT SERPL-MCNC: 0.77 MG/DL (ref 0.6–1.3)
ERYTHROCYTE [DISTWIDTH] IN BLOOD BY AUTOMATED COUNT: 15.6 % (ref 11.6–15.1)
GFR SERPL CREATININE-BSD FRML MDRD: 84 ML/MIN/1.73SQ M
GLUCOSE P FAST SERPL-MCNC: 75 MG/DL (ref 65–99)
HCT VFR BLD AUTO: 38 % (ref 34.8–46.1)
HGB BLD-MCNC: 13.6 G/DL (ref 11.5–15.4)
MCH RBC QN AUTO: 35.4 PG (ref 26.8–34.3)
MCHC RBC AUTO-ENTMCNC: 35.8 G/DL (ref 31.4–37.4)
MCV RBC AUTO: 99 FL (ref 82–98)
PLATELET # BLD AUTO: 229 THOUSANDS/UL (ref 149–390)
PMV BLD AUTO: 13.2 FL (ref 8.9–12.7)
POTASSIUM SERPL-SCNC: 3.5 MMOL/L (ref 3.5–5.3)
PROT SERPL-MCNC: 7.3 G/DL (ref 6.4–8.2)
RBC # BLD AUTO: 3.84 MILLION/UL (ref 3.81–5.12)
SODIUM SERPL-SCNC: 137 MMOL/L (ref 136–145)
WBC # BLD AUTO: 5.32 THOUSAND/UL (ref 4.31–10.16)

## 2022-01-24 PROCEDURE — 36415 COLL VENOUS BLD VENIPUNCTURE: CPT

## 2022-01-24 PROCEDURE — 80053 COMPREHEN METABOLIC PANEL: CPT

## 2022-01-24 PROCEDURE — 85027 COMPLETE CBC AUTOMATED: CPT

## 2022-02-19 DIAGNOSIS — I25.10 CORONARY ARTERY DISEASE INVOLVING NATIVE CORONARY ARTERY OF NATIVE HEART WITHOUT ANGINA PECTORIS: ICD-10-CM

## 2022-02-19 DIAGNOSIS — I10 ESSENTIAL HYPERTENSION: ICD-10-CM

## 2022-02-21 RX ORDER — METOPROLOL SUCCINATE 25 MG/1
TABLET, EXTENDED RELEASE ORAL
Qty: 90 TABLET | Refills: 3 | Status: SHIPPED | OUTPATIENT
Start: 2022-02-21 | End: 2022-05-12 | Stop reason: SDUPTHER

## 2022-05-12 DIAGNOSIS — I10 ESSENTIAL HYPERTENSION: ICD-10-CM

## 2022-05-12 DIAGNOSIS — I25.10 CORONARY ARTERY DISEASE INVOLVING NATIVE CORONARY ARTERY OF NATIVE HEART WITHOUT ANGINA PECTORIS: ICD-10-CM

## 2022-05-12 NOTE — TELEPHONE ENCOUNTER
Requested medication(s) are due for refill today: Yes  Patient has already received a courtesy refill: No  Other reason request has been forwarded to provider:    Cardiovascular:  Beta Blockers Failed 05/12/2022 10:55 AM   Protocol Details  Valid encounter within last 6 months

## 2022-05-13 RX ORDER — METOPROLOL SUCCINATE 25 MG/1
25 TABLET, EXTENDED RELEASE ORAL DAILY
Qty: 90 TABLET | Refills: 4 | Status: SHIPPED | OUTPATIENT
Start: 2022-05-13

## 2022-06-06 ENCOUNTER — APPOINTMENT (OUTPATIENT)
Dept: LAB | Facility: CLINIC | Age: 62
End: 2022-06-06
Payer: COMMERCIAL

## 2022-06-06 DIAGNOSIS — E78.5 HYPERLIPIDEMIA, UNSPECIFIED HYPERLIPIDEMIA TYPE: ICD-10-CM

## 2022-06-06 DIAGNOSIS — E55.9 VITAMIN D DEFICIENCY: ICD-10-CM

## 2022-06-06 DIAGNOSIS — I10 HYPERTENSION, UNSPECIFIED TYPE: ICD-10-CM

## 2022-06-06 LAB
25(OH)D3 SERPL-MCNC: 41.6 NG/ML (ref 30–100)
ALBUMIN SERPL BCP-MCNC: 3.4 G/DL (ref 3.5–5)
ALP SERPL-CCNC: 91 U/L (ref 46–116)
ALT SERPL W P-5'-P-CCNC: 48 U/L (ref 12–78)
ANION GAP SERPL CALCULATED.3IONS-SCNC: 3 MMOL/L (ref 4–13)
AST SERPL W P-5'-P-CCNC: 40 U/L (ref 5–45)
BILIRUB SERPL-MCNC: 0.66 MG/DL (ref 0.2–1)
BUN SERPL-MCNC: 10 MG/DL (ref 5–25)
CALCIUM ALBUM COR SERPL-MCNC: 9.8 MG/DL (ref 8.3–10.1)
CALCIUM SERPL-MCNC: 9.3 MG/DL (ref 8.3–10.1)
CHLORIDE SERPL-SCNC: 101 MMOL/L (ref 100–108)
CHOLEST SERPL-MCNC: 158 MG/DL
CO2 SERPL-SCNC: 34 MMOL/L (ref 21–32)
CREAT SERPL-MCNC: 0.81 MG/DL (ref 0.6–1.3)
ERYTHROCYTE [DISTWIDTH] IN BLOOD BY AUTOMATED COUNT: 14.4 % (ref 11.6–15.1)
GFR SERPL CREATININE-BSD FRML MDRD: 78 ML/MIN/1.73SQ M
GLUCOSE P FAST SERPL-MCNC: 95 MG/DL (ref 65–99)
HCT VFR BLD AUTO: 38.5 % (ref 34.8–46.1)
HDLC SERPL-MCNC: 57 MG/DL
HGB BLD-MCNC: 14 G/DL (ref 11.5–15.4)
LDLC SERPL CALC-MCNC: 78 MG/DL (ref 0–100)
MCH RBC QN AUTO: 35.4 PG (ref 26.8–34.3)
MCHC RBC AUTO-ENTMCNC: 36.4 G/DL (ref 31.4–37.4)
MCV RBC AUTO: 98 FL (ref 82–98)
NONHDLC SERPL-MCNC: 101 MG/DL
PLATELET # BLD AUTO: 221 THOUSANDS/UL (ref 149–390)
PMV BLD AUTO: 13.6 FL (ref 8.9–12.7)
POTASSIUM SERPL-SCNC: 3.2 MMOL/L (ref 3.5–5.3)
PROT SERPL-MCNC: 7.7 G/DL (ref 6.4–8.2)
RBC # BLD AUTO: 3.95 MILLION/UL (ref 3.81–5.12)
SODIUM SERPL-SCNC: 138 MMOL/L (ref 136–145)
TRIGL SERPL-MCNC: 113 MG/DL
WBC # BLD AUTO: 5.07 THOUSAND/UL (ref 4.31–10.16)

## 2022-06-06 PROCEDURE — 80061 LIPID PANEL: CPT

## 2022-06-06 PROCEDURE — 80053 COMPREHEN METABOLIC PANEL: CPT

## 2022-06-06 PROCEDURE — 36415 COLL VENOUS BLD VENIPUNCTURE: CPT

## 2022-06-06 PROCEDURE — 85027 COMPLETE CBC AUTOMATED: CPT

## 2022-06-06 PROCEDURE — 82306 VITAMIN D 25 HYDROXY: CPT

## 2022-10-13 ENCOUNTER — APPOINTMENT (OUTPATIENT)
Dept: LAB | Facility: CLINIC | Age: 62
End: 2022-10-13
Payer: COMMERCIAL

## 2022-10-13 DIAGNOSIS — I10 ESSENTIAL HYPERTENSION: ICD-10-CM

## 2022-10-13 DIAGNOSIS — E55.9 VITAMIN D DEFICIENCY: ICD-10-CM

## 2022-10-13 LAB
25(OH)D3 SERPL-MCNC: 38 NG/ML (ref 30–100)
ALBUMIN SERPL BCP-MCNC: 3.1 G/DL (ref 3.5–5)
ALP SERPL-CCNC: 90 U/L (ref 46–116)
ALT SERPL W P-5'-P-CCNC: 31 U/L (ref 12–78)
ANION GAP SERPL CALCULATED.3IONS-SCNC: 2 MMOL/L (ref 4–13)
AST SERPL W P-5'-P-CCNC: 22 U/L (ref 5–45)
BILIRUB SERPL-MCNC: 0.38 MG/DL (ref 0.2–1)
BUN SERPL-MCNC: 9 MG/DL (ref 5–25)
CALCIUM ALBUM COR SERPL-MCNC: 9.7 MG/DL (ref 8.3–10.1)
CALCIUM SERPL-MCNC: 9 MG/DL (ref 8.3–10.1)
CHLORIDE SERPL-SCNC: 107 MMOL/L (ref 96–108)
CO2 SERPL-SCNC: 29 MMOL/L (ref 21–32)
CREAT SERPL-MCNC: 0.81 MG/DL (ref 0.6–1.3)
GFR SERPL CREATININE-BSD FRML MDRD: 78 ML/MIN/1.73SQ M
GLUCOSE SERPL-MCNC: 75 MG/DL (ref 65–140)
POTASSIUM SERPL-SCNC: 3.5 MMOL/L (ref 3.5–5.3)
PROT SERPL-MCNC: 6.9 G/DL (ref 6.4–8.4)
SODIUM SERPL-SCNC: 138 MMOL/L (ref 135–147)

## 2022-10-13 PROCEDURE — 82306 VITAMIN D 25 HYDROXY: CPT

## 2022-10-13 PROCEDURE — 36415 COLL VENOUS BLD VENIPUNCTURE: CPT

## 2022-10-13 PROCEDURE — 80053 COMPREHEN METABOLIC PANEL: CPT

## 2023-01-04 ENCOUNTER — OFFICE VISIT (OUTPATIENT)
Dept: CARDIOLOGY CLINIC | Facility: CLINIC | Age: 63
End: 2023-01-04

## 2023-01-04 VITALS
BODY MASS INDEX: 23.82 KG/M2 | OXYGEN SATURATION: 99 % | WEIGHT: 138.8 LBS | SYSTOLIC BLOOD PRESSURE: 126 MMHG | DIASTOLIC BLOOD PRESSURE: 90 MMHG | HEART RATE: 61 BPM

## 2023-01-04 DIAGNOSIS — I10 ESSENTIAL HYPERTENSION: ICD-10-CM

## 2023-01-04 DIAGNOSIS — I25.10 CORONARY ARTERY DISEASE INVOLVING NATIVE CORONARY ARTERY OF NATIVE HEART WITHOUT ANGINA PECTORIS: ICD-10-CM

## 2023-01-04 DIAGNOSIS — I73.9 CLAUDICATION OF RIGHT LOWER EXTREMITY (HCC): Primary | ICD-10-CM

## 2023-01-04 DIAGNOSIS — E78.00 HYPERCHOLESTEROLEMIA: ICD-10-CM

## 2023-01-04 DIAGNOSIS — Z72.0 TOBACCO ABUSE: ICD-10-CM

## 2023-01-08 DIAGNOSIS — I10 ESSENTIAL HYPERTENSION: ICD-10-CM

## 2023-01-09 PROBLEM — Z72.0 TOBACCO ABUSE: Status: ACTIVE | Noted: 2023-01-09

## 2023-01-09 NOTE — PROGRESS NOTES
Cardiology Follow Up    Clovia Cabot  1960  [id number]  [facility] [place]  [address]  [address]  [address] [address]  [phone]    1  Claudication of right lower extremity (HCC)  VAS lower limb arterial duplex, limited, unilateral      2  Coronary artery disease involving native coronary artery of native heart without angina pectoris        3  Essential hypertension        4  Hypercholesterolemia        5  Tobacco abuse              Discussion/Summary: Her cardiac status appear to be stable  I stressed to her that she must stop smoking  Her RLE calf pain sounds like claudication and I ordered a VAS lower limb arterial duplex study  She may need to see vascular  RTO 1 year  Interval History: She has not had any significant cardiac problems since her last OV  She continues to smoke 1/2 packs a day  She denies CP or significant SOB  She does describe R calf pain and tightness with activity which is now happening often  It goes away with rest     She has CAD with prior RCA stenting in 2002      EST  11/2020 - 6: 06 Clement, no ischemia    LDL 78  /90  Creatinine 0 81    Patient Active Problem List   Diagnosis   • Coronary artery disease involving native coronary artery of native heart without angina pectoris   • Hypercholesterolemia   • Essential hypertension   • Tobacco abuse     Past Medical History:   Diagnosis Date   • Arthritis    • Coronary artery disease    • Hypercholesteremia    • Hypertension      Social History     Socioeconomic History   • Marital status: /Civil Union     Spouse name: Not on file   • Number of children: Not on file   • Years of education: Not on file   • Highest education level: Not on file   Occupational History   • Not on file   Tobacco Use   • Smoking status: Former     Types: Cigarettes, E-Cigarettes     Quit date: 04/2019     Years since quitting: 3 7   • Smokeless tobacco: Never   • Tobacco comments:     Now vaping   Vaping Use   • Vaping Use: Not on file   Substance and Sexual Activity   • Alcohol use: Yes     Comment: socially   • Drug use: Never   • Sexual activity: Not on file   Other Topics Concern   • Not on file   Social History Narrative   • Not on file     Social Determinants of Health     Financial Resource Strain: Not on file   Food Insecurity: Not on file   Transportation Needs: Not on file   Physical Activity: Not on file   Stress: Not on file   Social Connections: Not on file   Intimate Partner Violence: Not on file   Housing Stability: Not on file      No family history on file    Past Surgical History:   Procedure Laterality Date   • CORONARY STENT PLACEMENT  2002       Current Outpatient Medications:   •  ALPRAZolam (XANAX) 1 mg tablet, alprazolam 1 mg tablet  take 1 tablet by mouth at bedtime if needed, Disp: , Rfl:   •  aspirin (ECOTRIN LOW STRENGTH) 81 mg EC tablet, Take 81 mg by mouth daily, Disp: , Rfl:   •  atorvastatin (LIPITOR) 40 mg tablet, Take 1 tablet (40 mg total) by mouth daily, Disp: 90 tablet, Rfl: 3  •  Cholecalciferol 125 MCG (5000 UT) capsule, cholecalciferol (vitamin D3) 1,250 mcg (50,000 unit) capsule  take 1 capsule by mouth every week, Disp: , Rfl:   •  lisinopril-hydrochlorothiazide (PRINZIDE,ZESTORETIC) 20-12 5 MG per tablet, Take 2 tablets by mouth daily, Disp: 180 tablet, Rfl: 4  •  metoprolol succinate (TOPROL-XL) 25 mg 24 hr tablet, Take 1 tablet (25 mg total) by mouth in the morning , Disp: 90 tablet, Rfl: 4  •  predniSONE 5 mg tablet, Take 5 mg by mouth daily, Disp: , Rfl:   •  lisinopril (ZESTRIL) 20 mg tablet, Take 1 tablet (20 mg total) by mouth daily (Patient not taking: Reported on 12/1/2021), Disp: 90 tablet, Rfl: 4  •  predniSONE 10 mg tablet, 3 tabs daily for 3 days, 2 tabs daily for 3 days, 1 tab daily for 3 days (Patient not taking: Reported on 12/1/2021), Disp: 18 tablet, Rfl: 0  No Known Allergies  Vitals:    01/04/23 1448 BP: 126/90   BP Location: Right arm   Patient Position: Sitting   Cuff Size: Standard   Pulse: 61   SpO2: 99%   Weight: 63 kg (138 lb 12 8 oz)     Weight (last 2 days)     None         Blood pressure 126/90, pulse 61, weight 63 kg (138 lb 12 8 oz), SpO2 99 %  , Body mass index is 23 82 kg/m²  Labs:  Appointment on 10/13/2022   Component Date Value   • Vit D, 25-Hydroxy 10/13/2022 38 0    • Sodium 10/13/2022 138    • Potassium 10/13/2022 3 5    • Chloride 10/13/2022 107    • CO2 10/13/2022 29    • ANION GAP 10/13/2022 2 (L)    • BUN 10/13/2022 9    • Creatinine 10/13/2022 0 81    • Glucose 10/13/2022 75    • Calcium 10/13/2022 9 0    • Corrected Calcium 10/13/2022 9 7    • AST 10/13/2022 22    • ALT 10/13/2022 31    • Alkaline Phosphatase 10/13/2022 90    • Total Protein 10/13/2022 6 9    • Albumin 10/13/2022 3 1 (L)    • Total Bilirubin 10/13/2022 0 38    • eGFR 10/13/2022 78      Imaging: No results found  Review of Systems:  Review of Systems   Constitutional: Negative for diaphoresis, fatigue, fever and unexpected weight change  HENT: Negative  Respiratory: Negative for cough, shortness of breath and wheezing  Cardiovascular: Negative for chest pain, palpitations and leg swelling  Gastrointestinal: Negative for abdominal pain, diarrhea and nausea  Musculoskeletal: Negative for gait problem and myalgias  Skin: Negative for rash  Neurological: Negative for dizziness and numbness  Psychiatric/Behavioral: Negative  Physical Exam:  Physical Exam  Constitutional:       Appearance: She is well-developed  HENT:      Head: Normocephalic and atraumatic  Eyes:      Pupils: Pupils are equal, round, and reactive to light  Neck:      Vascular: No JVD  Cardiovascular:      Rate and Rhythm: Regular rhythm  Pulses: Normal pulses  Carotid pulses are 2+ on the right side and 2+ on the left side       Heart sounds: S1 normal and S2 normal    Pulmonary:      Effort: Pulmonary effort is normal       Breath sounds: Normal breath sounds  No wheezing or rales  Abdominal:      General: Bowel sounds are normal       Palpations: Abdomen is soft  Tenderness: There is no abdominal tenderness  Musculoskeletal:         General: No tenderness  Normal range of motion  Cervical back: Normal range of motion and neck supple  Skin:     General: Skin is warm  Neurological:      Mental Status: She is alert and oriented to person, place, and time  Cranial Nerves: No cranial nerve deficit  Deep Tendon Reflexes: Reflexes are normal and symmetric

## 2023-01-12 ENCOUNTER — TELEPHONE (OUTPATIENT)
Dept: CARDIOLOGY CLINIC | Facility: CLINIC | Age: 63
End: 2023-01-12

## 2023-01-12 ENCOUNTER — HOSPITAL ENCOUNTER (OUTPATIENT)
Dept: NON INVASIVE DIAGNOSTICS | Facility: HOSPITAL | Age: 63
Discharge: HOME | End: 2023-01-12

## 2023-01-12 DIAGNOSIS — I73.9 CLAUDICATION OF RIGHT LOWER EXTREMITY (HCC): ICD-10-CM

## 2023-01-12 NOTE — TELEPHONE ENCOUNTER
Kenya Sheth from vascular called, and pt was there for her vas lower limb duplex arterial limited, unilateral, but she feels she would benefit from a bilaterally  Advised can TT Dr Ashok Randhawa and ask     TT back and is fine with doing bilaterally    Rolando Mccracken and advised   Verbally understood

## 2023-01-25 ENCOUNTER — TELEPHONE (OUTPATIENT)
Dept: CARDIOLOGY CLINIC | Facility: CLINIC | Age: 63
End: 2023-01-25

## 2023-01-25 DIAGNOSIS — I73.9 PVD (PERIPHERAL VASCULAR DISEASE) WITH CLAUDICATION (HCC): Primary | ICD-10-CM

## 2023-01-25 NOTE — TELEPHONE ENCOUNTER
----- Message from Sebastián Herman MD sent at 1/25/2023  1:17 PM EST -----  Please tell her that her vascular study showed blockages in her leg arteries and that I want her to see vascular  Please set up appointment as soon as possible    I will put in the referral     Thx

## 2023-01-31 RX ORDER — LISINOPRIL AND HYDROCHLOROTHIAZIDE 20; 12.5 MG/1; MG/1
TABLET ORAL
Qty: 180 TABLET | Refills: 4 | Status: SHIPPED | OUTPATIENT
Start: 2023-01-31

## 2023-02-17 ENCOUNTER — CONSULT (OUTPATIENT)
Dept: VASCULAR SURGERY | Facility: CLINIC | Age: 63
End: 2023-02-17

## 2023-02-17 VITALS
WEIGHT: 143 LBS | SYSTOLIC BLOOD PRESSURE: 148 MMHG | HEIGHT: 64 IN | DIASTOLIC BLOOD PRESSURE: 82 MMHG | BODY MASS INDEX: 24.41 KG/M2

## 2023-02-17 DIAGNOSIS — I73.9 PVD (PERIPHERAL VASCULAR DISEASE) WITH CLAUDICATION (HCC): ICD-10-CM

## 2023-02-17 RX ORDER — PANTOPRAZOLE SODIUM 40 MG/1
40 TABLET, DELAYED RELEASE ORAL DAILY
COMMUNITY
Start: 2022-12-15

## 2023-02-17 NOTE — PATIENT INSTRUCTIONS
-Educated patient on pathophysiology of peripheral arterial disease, available treatment options, and indications for treatment    -Discussed risk factor modification, including adequate glycemic and lipid control, smoking cessation, blood pressure, and lifestyle modifications    -Continue medical optimization with daily ASA 81mg and statin therapy with LDL goal <100    -Recommend starting a structured walking program 3-5 times/week for 30 minutes  Patient should walk until claudication symptoms occur, rest for 5-10 minutes, then continue to walk  Patient should increase distance each week  -Follow up in 6 months or sooner if needed  -If you start to have worsening pain with walking, rest pain, or tissue loss, please notify our office    -We will get an updated ultrasound in 6 months    -Instructed patient to call the office with questions, concerns, or new symptoms  Peripheral Artery Disease   AMBULATORY CARE:   Peripheral artery disease (PAD)  is narrow, weak, or blocked arteries  It may affect any arteries outside of your heart and brain  PAD is usually the result of a buildup of fat and cholesterol, also called plaque, along your artery walls  Inflammation, a blood clot, or abnormal cell growth could also block your arteries  PAD prevents normal blood flow to your legs and arms  You are at risk of an amputation if poor blood flow keeps wounds from healing or causes gangrene (tissue death)  Without treatment, PAD can also cause a heart attack or stroke  Common symptoms include:  Mild PAD usually does not cause symptoms   As the disease worsens over time, you may have the following:  Pain or cramps in your leg or hip while you walk    A numb, weak, or heavy feeling in your legs    Dry, scaly, red, or pale skin on your legs    Thick or brittle nails, or hair loss on your arms and legs    Foot sores that will not heal    Burning or aching in your feet and toes while resting (this may be worse when you lie down)    Call your local emergency number (911 in the [address] [facility]) if:   You have any of the following signs of a heart attack:      Squeezing, pressure, or pain in your chest    You may  also have any of the following:     Discomfort or pain in your back, neck, jaw, stomach, or arm    Shortness of breath    Nausea or vomiting    Lightheadedness or a sudden cold sweat    You have any of the following signs of a stroke:      Numbness or drooping on one side of your face     Weakness in an arm or leg    Confusion or difficulty speaking    Dizziness, a severe headache, or vision loss    Seek care immediately if:   You have sores or wounds that will not heal     You notice black or discolored skin on your arm or leg  Your skin is cool to the touch  Call your doctor if:   You have leg pain when you walk 1/8 mile (200 meters) or less, even with treatment  Your legs are red, dry, or pale, even with treatment  You have questions or concerns about your condition or care  Treatment for PAD  can help reduce your risk of a heart attack, stroke, or amputation  You may need more than one of the following:  Medicines  may be given to prevent blood clots and reduce the risk of a heart attack or stroke  You may be given medicine to help prevent your PAD from getting worse  A supervised exercise program  helps you stay active in normal daily activities  Healthcare providers will help you safely walk or do strength training exercises 3 times a week for 30 to 60 minutes  You will do this for several months, then transition to walking on your own  Angioplasty  is a procedure to open your artery so blood can flow through normally  A thin tube called a catheter is used to insert a small balloon into your artery  The balloon is inflated to open your blocked artery, and then removed  A tube called a stent may be placed in your artery to hold it open           Bypass surgery  is used to make a new connection to your artery with a vein from another part of your body, or an artificial graft  The vein or graft is attached to your artery above and below your blockage  This allows blood to flow around the blocked portion of your artery  Manage and prevent PAD:   Walk for 30 to 60 minutes at least 4 times a week  Your healthcare provider may also refer you to a supervised exercise program  The program helps increase how far you can walk without pain  It also helps you stay active in normal daily activities         Do not smoke  Nicotine and other chemicals in cigarettes and cigars can worsen PAD  They can also increase your risk for a heart attack or stroke  Ask your healthcare provider for information if you currently smoke and need help to quit  E-cigarettes or smokeless tobacco still contain nicotine  Talk to your healthcare provider before you use these products  Manage other health conditions  Take your medicines as directed and follow your healthcare provider's instructions if you have high blood pressure or high cholesterol  Perform foot care and check your blood sugar levels as directed if you have diabetes  Eat heart-healthy foods  Eat whole grains, fruits, and vegetables every day  Limit salt and high-fat foods  Ask your healthcare provider for more information on a heart healthy diet  Ask what a healthy weight is for you  Your healthcare provider can help you create a healthy weight-loss plan, if needed  Follow up with your doctor as directed:  Write down your questions so you remember to ask them during your visits  © [employer] 2022 Information is for End User's use only and may not be sold, redistributed or otherwise used for commercial purposes  The above information is an  only  It is not intended as medical advice for individual conditions or treatments  Talk to your doctor, nurse or pharmacist before following any medical regimen to see if it is safe and effective for you

## 2023-02-17 NOTE — PROGRESS NOTES
Assessment/Plan:    PVD (peripheral vascular disease) with claudication [facility])  43-year-old female, smoker, with past medical history significant for CAD s/p stent, HLD, HTN, tobacco dependence  She presents today for consultation regarding right lower extremity calf claudication after 1 block  She reports her symptoms started in July 2022  She denies rest pain or tissue loss  She denies symptoms in LLE  -KURTIS 1/12/2023  --RLE: Disease without focal stenosis  Moderate to severe tibioperoneal disease  BRANDON 0 83/36/33  --LLE: Diffuse disease with focal stenosis in proximal to distal SFA with reconstitution in the popliteal artery  Evidence of tibioperoneal disease  BRANDON 0 6 9/60/69  Exam: Bilateral lower extremities warm without cyanosis or edema  Clusters of spider and reticular veins R>L  No ischemic ulcerations  Nonpalpable DP pulses with monophasic waveforms  Palpable femoral pulses  Motor and sensory intact  Recommendations  -Educated patient on pathophysiology of peripheral arterial disease, available treatment options, and indications for treatment    -Discussed risk factor modification, including adequate glycemic and lipid control, smoking cessation, blood pressure, and lifestyle modifications    -Continue medical optimization with daily ASA 81mg and statin therapy with LDL goal <100    -Recommend starting a structured walking program 3-5 times/week for 30 minutes  Patient should walk until claudication symptoms occur, rest for 5-10 minutes, then continue to walk  Patient should increase distance each week  -We will continue surveillance with lower extremity arterial duplex in 6 months to assess for progression of disease   -Counseled on smoking cessation  Smoking 0 5 ppd    -Follow up in 6 months after KURTIS to re-evaluation and discussion on possible intervention if she can quit smoking    -Instructed patient to call the office with questions, concerns, or new symptoms            Diagnoses and all orders for this visit:    PVD (peripheral vascular disease) with claudication ([facility] [address] )  -     Ambulatory referral to Vascular Surgery  -     VAS lower limb arterial duplex, complete bilateral; Future    Other orders  -     pantoprazole (PROTONIX) 40 mg tablet; Take 40 mg by mouth daily          Subjective:      Patient ID: Rosetta Varela is a 65 y o  female  HPI  [facility] presents today for consultation regarding her right lower extremity calf tightness after walking approximately 1 block  She reports this resolves with rest   She first noticed her symptoms started last July while at [facility]  She denies symptoms of ischemic rest pain or tissue loss  Denies any symptoms in the left lower extremity  She is currently on aspirin 81 mg and atorvastatin 40 mg  She has a history of CAD and had a stent placed  She reports her symptoms are not disabling  She works on her feet throughout the day and reports no issues  She reports majority of her symptoms are with incline or strenuous exercise  Smoking a half a pack per day  She is a current smoker, smoking 0 5 ppd  The following portions of the patient's history were reviewed and updated as appropriate: allergies, current medications, past family history, past medical history, past social history, past surgical history and problem list     Review of Systems   Constitutional: Negative  HENT: Negative  Eyes: Negative  Respiratory: Negative  Cardiovascular: Negative  Gastrointestinal: Negative  Endocrine: Negative  Genitourinary: Negative  Musculoskeletal: Negative  Skin: Negative  Allergic/Immunologic: Negative  Neurological: Negative  Hematological: Negative  Psychiatric/Behavioral: Negative  I have personally reviewed and made appropriate changes to the ROS that was input by the medical assistant       Objective:        Vitals:    02/17/23 1437   BP: 148/82   BP Location: Right arm   Patient Position: Sitting Cuff Size: Standard   Weight: 64 9 kg (143 lb)   Height: 5' 4" (1 626 m)       Patient Active Problem List   Diagnosis   • Coronary artery disease involving native coronary artery of native heart without angina pectoris   • Hypercholesterolemia   • Essential hypertension   • Tobacco abuse   • PVD (peripheral vascular disease) with claudication [facility])       Past Surgical History:   Procedure Laterality Date   • CORONARY STENT PLACEMENT  2002       History reviewed  No pertinent family history      Social History     Socioeconomic History   • Marital status: /Civil Union     Spouse name: Not on file   • Number of children: Not on file   • Years of education: Not on file   • Highest education level: Not on file   Occupational History   • Not on file   Tobacco Use   • Smoking status: Every Day     Packs/day: 0 50     Types: Cigarettes, E-Cigarettes     Last attempt to quit: 04/2019     Years since quitting: 3 8   • Smokeless tobacco: Never   • Tobacco comments:     Now vaping   Vaping Use   • Vaping Use: Not on file   Substance and Sexual Activity   • Alcohol use: Yes     Comment: socially   • Drug use: Never   • Sexual activity: Not on file   Other Topics Concern   • Not on file   Social History Narrative   • Not on file     Social Determinants of Health     Financial Resource Strain: Not on file   Food Insecurity: Not on file   Transportation Needs: Not on file   Physical Activity: Not on file   Stress: Not on file   Social Connections: Not on file   Intimate Partner Violence: Not on file   Housing Stability: Not on file       No Known Allergies      Current Outpatient Medications:   •  ALPRAZolam (XANAX) 1 mg tablet, alprazolam 1 mg tablet  take 1 tablet by mouth at bedtime if needed, Disp: , Rfl:   •  aspirin (ECOTRIN LOW STRENGTH) 81 mg EC tablet, Take 81 mg by mouth daily, Disp: , Rfl:   •  atorvastatin (LIPITOR) 40 mg tablet, Take 1 tablet (40 mg total) by mouth daily, Disp: 90 tablet, Rfl: 3  • Cholecalciferol 125 MCG (5000 UT) capsule, cholecalciferol (vitamin D3) 1,250 mcg (50,000 unit) capsule  take 1 capsule by mouth every week, Disp: , Rfl:   •  lisinopril-hydrochlorothiazide (PRINZIDE,ZESTORETIC) 20-12 5 MG per tablet, TAKE 2 TABLETS BY MOUTH EVERY DAY, Disp: 180 tablet, Rfl: 4  •  metoprolol succinate (TOPROL-XL) 25 mg 24 hr tablet, Take 1 tablet (25 mg total) by mouth in the morning , Disp: 90 tablet, Rfl: 4  •  pantoprazole (PROTONIX) 40 mg tablet, Take 40 mg by mouth daily, Disp: , Rfl:   •  predniSONE 10 mg tablet, 3 tabs daily for 3 days, 2 tabs daily for 3 days, 1 tab daily for 3 days, Disp: 18 tablet, Rfl: 0  •  predniSONE 5 mg tablet, Take 5 mg by mouth daily, Disp: , Rfl:   •  lisinopril (ZESTRIL) 20 mg tablet, Take 1 tablet (20 mg total) by mouth daily (Patient not taking: Reported on 2/17/2023), Disp: 90 tablet, Rfl: 4    /82 (BP Location: Right arm, Patient Position: Sitting, Cuff Size: Standard)   Ht 5' 4" (1 626 m)   Wt 64 9 kg (143 lb)   BMI 24 55 kg/m²          Physical Exam  Vitals and nursing note reviewed  Constitutional:       Appearance: Normal appearance  HENT:      Head: Normocephalic and atraumatic  Neck:      Vascular: No carotid bruit  Cardiovascular:      Rate and Rhythm: Normal rate and regular rhythm  Pulses:           Carotid pulses are 2+ on the right side and 2+ on the left side  Radial pulses are 2+ on the right side and 2+ on the left side  Femoral pulses are 2+ on the right side and 2+ on the left side  Dorsalis pedis pulses are detected w/ Doppler on the right side and detected w/ Doppler on the left side  Heart sounds: Normal heart sounds  Comments: No carotid bruit   Pulmonary:      Effort: Pulmonary effort is normal  No respiratory distress  Breath sounds: Normal breath sounds  Abdominal:      General: Bowel sounds are normal  There is no distension  Palpations: Abdomen is soft        Comments: No abdominal bruit or pulsatile masses  Musculoskeletal:         General: No swelling or signs of injury  Normal range of motion  Cervical back: Normal range of motion and neck supple  Right lower leg: No edema  Left lower leg: No edema  Skin:     General: Skin is warm  Capillary Refill: Capillary refill takes less than 2 seconds  Findings: No erythema  Comments: Bilateral lower extremity chronic venous insufficiency R>L  Ischemic ulcerations, cyanosis, edema  Neurological:      General: No focal deficit present  Mental Status: She is alert and oriented to person, place, and time  Psychiatric:         Mood and Affect: Mood normal          Behavior: Behavior normal        Kary Aguilar PA-C  [facility]  [phone]    This text is generated with voice recognition software  There may be translation, syntax, or grammatical errors  If have any questions, please contact the dictating provider

## 2023-02-17 NOTE — ASSESSMENT & PLAN NOTE
69-year-old female, smoker, with past medical history significant for CAD s/p stent, HLD, HTN, tobacco dependence  She presents today for consultation regarding right lower extremity calf claudication after 0 5 miles  She reports her symptoms started in July 2022  She denies rest pain or tissue loss  -KURTIS 1/12/2023  --RLE: Disease without focal stenosis  Moderate to severe tibioperoneal disease  BRANDON 0 83/36/33  --LLE: Diffuse disease with focal stenosis in proximal to distal SFA with reconstitution in the popliteal artery  Evidence of tibioperoneal disease  BRANDON 0 6 9/60/69  Exam: Bilateral lower extremities warm without cyanosis or edema  No ischemic ulcerations  Nonpalpable DP pulses with monophasic waveforms  Palpable femoral pulses  Motor and sensory intact  Recommendations  -Educated patient on pathophysiology of peripheral arterial disease, available treatment options, and indications for treatment    -Discussed risk factor modification, including adequate glycemic and lipid control, smoking cessation, blood pressure, and lifestyle modifications    -Continue medical optimization with daily ASA 81mg and statin therapy with LDL goal <100    -Recommend starting a structured walking program 3-5 times/week for 30 minutes  Patient should walk until claudication symptoms occur, rest for 5-10 minutes, then continue to walk  Patient should increase distance each week  -We will continue surveillance with lower extremity arterial duplex in 6 months to assess for progression of disease   -Counseled on smoking cessation  Smoking 0 5 ppd    -Follow up in 6 months after KURTIS to re-evaluation and discussion on possible intervention if she can quit smoking    -Instructed patient to call the office with questions, concerns, or new symptoms

## 2023-04-26 ENCOUNTER — APPOINTMENT (OUTPATIENT)
Dept: LAB | Facility: CLINIC | Age: 63
End: 2023-04-26

## 2023-04-26 DIAGNOSIS — I10 HYPERTENSION, UNSPECIFIED TYPE: ICD-10-CM

## 2023-04-26 DIAGNOSIS — E55.9 VITAMIN D DEFICIENCY: ICD-10-CM

## 2023-04-26 DIAGNOSIS — E78.5 HYPERLIPIDEMIA, UNSPECIFIED HYPERLIPIDEMIA TYPE: ICD-10-CM

## 2023-04-26 LAB
25(OH)D3 SERPL-MCNC: 16.1 NG/ML (ref 30–100)
ALBUMIN SERPL BCP-MCNC: 3.5 G/DL (ref 3.5–5)
ALP SERPL-CCNC: 87 U/L (ref 46–116)
ALT SERPL W P-5'-P-CCNC: 42 U/L (ref 12–78)
ANION GAP SERPL CALCULATED.3IONS-SCNC: 5 MMOL/L (ref 4–13)
AST SERPL W P-5'-P-CCNC: 40 U/L (ref 5–45)
BASOPHILS # BLD AUTO: 0.02 THOUSANDS/ΜL (ref 0–0.1)
BASOPHILS NFR BLD AUTO: 0 % (ref 0–1)
BILIRUB SERPL-MCNC: 0.47 MG/DL (ref 0.2–1)
BUN SERPL-MCNC: 8 MG/DL (ref 5–25)
CALCIUM SERPL-MCNC: 9.4 MG/DL (ref 8.3–10.1)
CHLORIDE SERPL-SCNC: 103 MMOL/L (ref 96–108)
CHOLEST SERPL-MCNC: 148 MG/DL
CO2 SERPL-SCNC: 29 MMOL/L (ref 21–32)
CREAT SERPL-MCNC: 0.89 MG/DL (ref 0.6–1.3)
EOSINOPHIL # BLD AUTO: 0.29 THOUSAND/ΜL (ref 0–0.61)
EOSINOPHIL NFR BLD AUTO: 6 % (ref 0–6)
ERYTHROCYTE [DISTWIDTH] IN BLOOD BY AUTOMATED COUNT: 13.6 % (ref 11.6–15.1)
GFR SERPL CREATININE-BSD FRML MDRD: 69 ML/MIN/1.73SQ M
GLUCOSE P FAST SERPL-MCNC: 86 MG/DL (ref 65–99)
HCT VFR BLD AUTO: 41.2 % (ref 34.8–46.1)
HDLC SERPL-MCNC: 49 MG/DL
HGB BLD-MCNC: 14.3 G/DL (ref 11.5–15.4)
IMM GRANULOCYTES # BLD AUTO: 0.03 THOUSAND/UL (ref 0–0.2)
IMM GRANULOCYTES NFR BLD AUTO: 1 % (ref 0–2)
LDLC SERPL CALC-MCNC: 67 MG/DL (ref 0–100)
LYMPHOCYTES # BLD AUTO: 1.41 THOUSANDS/ΜL (ref 0.6–4.47)
LYMPHOCYTES NFR BLD AUTO: 28 % (ref 14–44)
MCH RBC QN AUTO: 33.5 PG (ref 26.8–34.3)
MCHC RBC AUTO-ENTMCNC: 34.7 G/DL (ref 31.4–37.4)
MCV RBC AUTO: 97 FL (ref 82–98)
MONOCYTES # BLD AUTO: 0.23 THOUSAND/ΜL (ref 0.17–1.22)
MONOCYTES NFR BLD AUTO: 5 % (ref 4–12)
NEUTROPHILS # BLD AUTO: 3.08 THOUSANDS/ΜL (ref 1.85–7.62)
NEUTS SEG NFR BLD AUTO: 60 % (ref 43–75)
NONHDLC SERPL-MCNC: 99 MG/DL
NRBC BLD AUTO-RTO: 0 /100 WBCS
PLATELET # BLD AUTO: 231 THOUSANDS/UL (ref 149–390)
PMV BLD AUTO: 13.9 FL (ref 8.9–12.7)
POTASSIUM SERPL-SCNC: 4.4 MMOL/L (ref 3.5–5.3)
PROT SERPL-MCNC: 7.3 G/DL (ref 6.4–8.4)
RBC # BLD AUTO: 4.27 MILLION/UL (ref 3.81–5.12)
SODIUM SERPL-SCNC: 137 MMOL/L (ref 135–147)
TRIGL SERPL-MCNC: 162 MG/DL
WBC # BLD AUTO: 5.06 THOUSAND/UL (ref 4.31–10.16)

## 2023-08-03 DIAGNOSIS — I25.10 CORONARY ARTERY DISEASE INVOLVING NATIVE CORONARY ARTERY OF NATIVE HEART WITHOUT ANGINA PECTORIS: ICD-10-CM

## 2023-08-03 DIAGNOSIS — I10 ESSENTIAL HYPERTENSION: ICD-10-CM

## 2023-08-03 RX ORDER — METOPROLOL SUCCINATE 25 MG/1
25 TABLET, EXTENDED RELEASE ORAL DAILY
Qty: 90 TABLET | Refills: 4 | Status: SHIPPED | OUTPATIENT
Start: 2023-08-03

## 2023-08-08 ENCOUNTER — APPOINTMENT (OUTPATIENT)
Dept: LAB | Facility: CLINIC | Age: 63
End: 2023-08-08
Payer: COMMERCIAL

## 2023-08-08 DIAGNOSIS — E55.9 VITAMIN D DEFICIENCY: ICD-10-CM

## 2023-08-08 DIAGNOSIS — I10 ESSENTIAL HYPERTENSION: ICD-10-CM

## 2023-08-08 LAB
ALBUMIN SERPL BCP-MCNC: 2.9 G/DL (ref 3.5–5)
ALP SERPL-CCNC: 89 U/L (ref 46–116)
ALT SERPL W P-5'-P-CCNC: 35 U/L (ref 12–78)
ANION GAP SERPL CALCULATED.3IONS-SCNC: 5 MMOL/L
AST SERPL W P-5'-P-CCNC: 29 U/L (ref 5–45)
BILIRUB SERPL-MCNC: 0.39 MG/DL (ref 0.2–1)
BUN SERPL-MCNC: 6 MG/DL (ref 5–25)
CALCIUM ALBUM COR SERPL-MCNC: 9.9 MG/DL (ref 8.3–10.1)
CALCIUM SERPL-MCNC: 9 MG/DL (ref 8.3–10.1)
CHLORIDE SERPL-SCNC: 102 MMOL/L (ref 96–108)
CO2 SERPL-SCNC: 31 MMOL/L (ref 21–32)
CREAT SERPL-MCNC: 0.85 MG/DL (ref 0.6–1.3)
GFR SERPL CREATININE-BSD FRML MDRD: 73 ML/MIN/1.73SQ M
GLUCOSE P FAST SERPL-MCNC: 81 MG/DL (ref 65–99)
POTASSIUM SERPL-SCNC: 3.4 MMOL/L (ref 3.5–5.3)
PROT SERPL-MCNC: 6.8 G/DL (ref 6.4–8.4)
SODIUM SERPL-SCNC: 138 MMOL/L (ref 135–147)

## 2023-08-08 PROCEDURE — 82306 VITAMIN D 25 HYDROXY: CPT

## 2023-08-08 PROCEDURE — 80053 COMPREHEN METABOLIC PANEL: CPT

## 2023-08-08 PROCEDURE — 36415 COLL VENOUS BLD VENIPUNCTURE: CPT

## 2023-08-09 LAB — 25(OH)D3 SERPL-MCNC: 24 NG/ML (ref 30–100)

## 2023-08-31 ENCOUNTER — HOSPITAL ENCOUNTER (OUTPATIENT)
Dept: NON INVASIVE DIAGNOSTICS | Facility: HOSPITAL | Age: 63
Discharge: HOME | End: 2023-08-31
Payer: COMMERCIAL

## 2023-08-31 DIAGNOSIS — I73.9 PVD (PERIPHERAL VASCULAR DISEASE) WITH CLAUDICATION (HCC): ICD-10-CM

## 2023-08-31 PROCEDURE — 93923 UPR/LXTR ART STDY 3+ LVLS: CPT | Performed by: SURGERY

## 2023-08-31 PROCEDURE — 93925 LOWER EXTREMITY STUDY: CPT

## 2023-08-31 PROCEDURE — 93923 UPR/LXTR ART STDY 3+ LVLS: CPT

## 2023-08-31 PROCEDURE — 93925 LOWER EXTREMITY STUDY: CPT | Performed by: SURGERY

## 2023-09-12 NOTE — ASSESSMENT & PLAN NOTE
56-year-old female, smoker, with past medical history significant for CAD s/p stent, HLD, HTN, tobacco dependence. She presents today for results review and 6-month follow-up on right lower extremity calf claudication after 0.5 miles. She reports her symptoms started in July 2022. She denies rest pain or tissue loss. -KURTIS 8/31/23  --RLE: Disease disease with >75% mid SFA stenosis with monophasic waveforms in CFA. Evidence of tibioperoneal disease. BRANDON 0.61/55/54 (prior 0.83/36/33)  --LLE: Diffuse disease with focal stenosis in proximal to mid SFA with reconstitution in the popliteal artery. Evidence of tibioperoneal disease. BRANDON 0.63/47/39 (Prior 0.69/60/69). Exam: Bilateral lower extremities warm without cyanosis or edema. No ischemic ulcerations. Nonpalpable DP pulses with monophasic waveforms. Palpable femoral pulses. Motor and sensory intact. Recommendations  -Educated patient on pathophysiology of peripheral arterial disease, available treatment options, and indications for treatment. -KURTIS and AOIL. -Discussed risk factor modification, including adequate glycemic and lipid control, smoking cessation, blood pressure, and lifestyle modifications.   -Continue medical optimization with daily ASA 81mg and statin therapy with LDL goal <100.   -Recommend starting a structured walking program 3-5 times/week for 30 minutes. Patient should walk until claudication symptoms occur, rest for 5-10 minutes, then continue to walk. Patient should increase distance each week. -Counseled on smoking cessation. Smoking 0.5 ppd.   -Follow up in 6 months after KURTIS to re-evaluation and discussion on possible intervention if she can quit smoking.   -Instructed patient to call the office with questions, concerns, or new symptoms.

## 2023-09-15 ENCOUNTER — OFFICE VISIT (OUTPATIENT)
Dept: VASCULAR SURGERY | Facility: CLINIC | Age: 63
End: 2023-09-15
Payer: COMMERCIAL

## 2023-09-15 VITALS
BODY MASS INDEX: 23.22 KG/M2 | WEIGHT: 136 LBS | SYSTOLIC BLOOD PRESSURE: 130 MMHG | HEART RATE: 72 BPM | HEIGHT: 64 IN | DIASTOLIC BLOOD PRESSURE: 76 MMHG

## 2023-09-15 DIAGNOSIS — I73.9 PVD (PERIPHERAL VASCULAR DISEASE) WITH CLAUDICATION (HCC): Primary | ICD-10-CM

## 2023-09-15 PROCEDURE — 99213 OFFICE O/P EST LOW 20 MIN: CPT

## 2023-09-15 NOTE — PROGRESS NOTES
Assessment/Plan:    PVD (peripheral vascular disease) with claudication [facility])  81-year-old female, smoker, with past medical history significant for CAD s/p stent, HLD, HTN, tobacco dependence. She presents today for results review and 6-month follow-up on right lower extremity calf claudication after 0.5 miles. She reports her symptoms started in July 2022 and have remained stable. She denies rest pain or tissue loss. -KURTIS 8/31/23  --RLE: Disease disease with >75% mid SFA stenosis with monophasic waveforms in CFA. Evidence of tibioperoneal disease. BRANDON 0.61/55/54 (prior 0.83/36/33)  --LLE: Diffuse disease with focal stenosis in proximal to mid SFA with reconstitution in the popliteal artery. Evidence of tibioperoneal disease. BRANDON 0.63/47/39 (Prior 0.69/60/69). Exam: Bilateral lower extremities warm without cyanosis or edema. No ischemic ulcerations. Multiple scattered spider and reticular veins across bilateral lower leg R>LLE. Nonpalpable DP pulses with monophasic waveforms. 1+ R femoral; 2+ L femoral pulses. Motor and sensory grossly intact. Recommendations  -Educated patient on pathophysiology of peripheral arterial disease, available treatment options, and indications for treatment.   -Obtain KURTIS and AOIL in 6 months.   -Discussed risk factor modification, including adequate glycemic and lipid control, smoking cessation, blood pressure, and lifestyle modifications.   -Continue medical optimization with daily ASA 81mg and statin therapy with LDL goal <100.   -Recommend starting a structured walking program 3-5 times/week for 30 minutes. Patient should walk until claudication symptoms occur, rest for 5-10 minutes, then continue to walk. Patient should increase distance each week. -Counseled on smoking cessation. Smoking 1ppd. (up from 0.5 ppd at last OV). -Instructed patient to call the office with questions, concerns, or new symptoms.           Diagnoses and all orders for this visit:    ROSY (peripheral vascular disease) with claudication (HCC)  -     VAS abdominal aorta/iliacs; complete study; Future  -     VAS lower limb arterial duplex, complete bilateral; Future          Subjective:      Patient ID: Macario Hutson is a 59 y.o. female. HPI  78-year-old female, smoker, with past medical history significant for CAD s/p stent, HLD, HTN, tobacco dependence. She presents today for results review and 6-month follow-up on right lower extremity calf claudication after 0.5 miles. She reports her symptoms started in July 2022 and have remained stable. She denies rest pain or tissue loss. She is taking aspirin 81 mg and atorvastatin 40 mg. Her LDL is well controlled at 67. She states she is smoking 1 ppd (up from 0.5 ppd 6 months ago). She reports calf cramping after approximately 0.5 blocks. More common up inclines or with increased exertion. She is able to carry out her normal activities and is not significantly limited by her claudication. The following portions of the patient's history were reviewed and updated as appropriate: allergies, current medications, past family history, past medical history, past social history, past surgical history and problem list.    Review of Systems   Constitutional: Negative. HENT: Negative. Eyes: Negative. Respiratory: Negative. Cardiovascular: Negative. Gastrointestinal: Negative. Endocrine: Negative. Genitourinary: Negative. Musculoskeletal: Negative. Skin: Negative. Allergic/Immunologic: Negative. Neurological: Negative. Hematological: Negative. Psychiatric/Behavioral: Negative. I have personally reviewed and made appropriate changes to the ROS that was input by the medical assistant.        Objective:      Vitals:    09/15/23 1348   BP: 130/76   BP Location: Left arm   Patient Position: Sitting   Cuff Size: Standard   Pulse: 72   Weight: 61.7 kg (136 lb)   Height: 5' 4" (1.626 m)       Patient Active Problem List Diagnosis   • Coronary artery disease involving native coronary artery of native heart without angina pectoris   • Hypercholesterolemia   • Essential hypertension   • Tobacco abuse   • PVD (peripheral vascular disease) with claudication [facility])       Past Surgical History:   Procedure Laterality Date   • CORONARY STENT PLACEMENT         No family history on file.     Social History     Socioeconomic History   • Marital status: /Civil Union     Spouse name: Not on file   • Number of children: Not on file   • Years of education: Not on file   • Highest education level: Not on file   Occupational History   • Not on file   Tobacco Use   • Smoking status: Every Day     Packs/day: 0.50     Types: Cigarettes, E-Cigarettes     Last attempt to quit: 2019     Years since quittin.4   • Smokeless tobacco: Never   • Tobacco comments:     Now vaping   Vaping Use   • Vaping Use: Not on file   Substance and Sexual Activity   • Alcohol use: Yes     Comment: socially   • Drug use: Never   • Sexual activity: Not on file   Other Topics Concern   • Not on file   Social History Narrative   • Not on file     Social Determinants of Health     Financial Resource Strain: Not on file   Food Insecurity: Not on file   Transportation Needs: Not on file   Physical Activity: Not on file   Stress: Not on file   Social Connections: Not on file   Intimate Partner Violence: Not on file   Housing Stability: Not on file       No Known Allergies      Current Outpatient Medications:   •  ALPRAZolam (XANAX) 1 mg tablet, alprazolam 1 mg tablet  take 1 tablet by mouth at bedtime if needed, Disp: , Rfl:   •  aspirin (ECOTRIN LOW STRENGTH) 81 mg EC tablet, Take 81 mg by mouth daily, Disp: , Rfl:   •  atorvastatin (LIPITOR) 40 mg tablet, Take 1 tablet (40 mg total) by mouth daily, Disp: 90 tablet, Rfl: 3  •  Cholecalciferol 125 MCG (5000 UT) capsule, cholecalciferol (vitamin D3) 1,250 mcg (50,000 unit) capsule  take 1 capsule by mouth every week, Disp: , Rfl:   •  lisinopril-hydrochlorothiazide (PRINZIDE,ZESTORETIC) 20-12.5 MG per tablet, TAKE 2 TABLETS BY MOUTH EVERY DAY, Disp: 180 tablet, Rfl: 4  •  metoprolol succinate (TOPROL-XL) 25 mg 24 hr tablet, TAKE 1 TABLET (25 MG TOTAL) BY MOUTH IN THE MORNING., Disp: 90 tablet, Rfl: 4  •  pantoprazole (PROTONIX) 40 mg tablet, Take 40 mg by mouth daily, Disp: , Rfl:   •  predniSONE 10 mg tablet, 3 tabs daily for 3 days, 2 tabs daily for 3 days, 1 tab daily for 3 days, Disp: 18 tablet, Rfl: 0  •  predniSONE 5 mg tablet, Take 5 mg by mouth daily, Disp: , Rfl:   •  lisinopril (ZESTRIL) 20 mg tablet, Take 1 tablet (20 mg total) by mouth daily (Patient not taking: Reported on 2/17/2023), Disp: 90 tablet, Rfl: 4      /76 (BP Location: Left arm, Patient Position: Sitting, Cuff Size: Standard)   Pulse 72   Ht 5' 4" (1.626 m)   Wt 61.7 kg (136 lb)   BMI 23.34 kg/m²          Physical Exam  Vitals and nursing note reviewed. Constitutional:       Appearance: Normal appearance. HENT:      Head: Normocephalic and atraumatic. Neck:      Vascular: Carotid bruit present. Cardiovascular:      Rate and Rhythm: Normal rate and regular rhythm. Pulses:           Carotid pulses are 2+ on the right side with bruit and 2+ on the left side. Radial pulses are 2+ on the right side and 2+ on the left side. Femoral pulses are 1+ on the right side and 2+ on the left side. Dorsalis pedis pulses are detected w/ Doppler on the right side and detected w/ Doppler on the left side. Posterior tibial pulses are detected w/ Doppler on the right side and detected w/ Doppler on the left side. Heart sounds: Normal heart sounds. Pulmonary:      Effort: Pulmonary effort is normal. No respiratory distress. Breath sounds: Normal breath sounds. Abdominal:      General: Bowel sounds are normal. There is no distension. Palpations: Abdomen is soft.       Comments: No abdominal bruit or pulsatile masses. Musculoskeletal:         General: No swelling. Normal range of motion. Cervical back: Normal range of motion and neck supple. Right lower leg: No edema. Left lower leg: No edema. Skin:     General: Skin is warm. Capillary Refill: Capillary refill takes 2 to 3 seconds. Findings: No erythema. Neurological:      General: No focal deficit present. Mental Status: She is alert and oriented to person, place, and time. Psychiatric:         Mood and Affect: Mood normal.         Behavior: Behavior normal.       Светлана Tenorio PA-C  [facility]  [phone]    I have spent a total time of 25 minutes on 09/15/23 in caring for this patient including Diagnostic results, Prognosis, Risks and benefits of tx options, Instructions for management, Patient and family education, Importance of tx compliance, Risk factor reductions, Impressions, Counseling / Coordination of care, Documenting in the medical record, Reviewing / ordering tests, medicine, procedures   and Obtaining or reviewing history  . This text is generated with voice recognition software. There may be translation, syntax, or grammatical errors. If have any questions, please contact the dictating provider.

## 2023-09-15 NOTE — PATIENT INSTRUCTIONS
Peripheral arterial disease (PAD)     -Educated patient on pathophysiology of peripheral arterial disease, available treatment options, and indications for treatment. -KURTIS/AOIL in 6 month follow up   -Discussed risk factor modification, including adequate glycemic and lipid control, smoking cessation, blood pressure, and lifestyle modifications.   -Continue medical optimization with daily ASA 81mg and statin therapy with LDL goal <100.   -Recommend starting a structured walking program 3-5 times/week for 30 minutes. Patient should walk until claudication symptoms occur, rest for 5-10 minutes, then continue to walk. Patient should increase distance each week. -Follow up in 6 months   -Instructed patient to call the office with questions, concerns, or new symptoms. Varicose veins  -Recommend conservative measures with use of daily compression hose (tubigrip size E), lower extremity elevation, regular exercise, and diligent skin care. -If the compression stocking causes increased discomfort, please take off and continue periodic leg elevation. Peripheral Artery Disease   AMBULATORY CARE:   Peripheral artery disease (PAD)  is narrow, weak, or blocked arteries. It may affect any arteries outside of your heart and brain. PAD is usually the result of a buildup of fat and cholesterol, also called plaque, along your artery walls. Inflammation, a blood clot, or abnormal cell growth could also block your arteries. PAD prevents normal blood flow to your legs and arms. You are at risk of an amputation if poor blood flow keeps wounds from healing or causes gangrene (tissue death). Without treatment, PAD can also cause a heart attack or stroke. Common symptoms include:  Mild PAD usually does not cause symptoms.  As the disease worsens over time, you may have the following:  Pain or cramps in your leg or hip while you walk    A numb, weak, or heavy feeling in your legs    Dry, scaly, red, or pale skin on your legs    Thick or brittle nails, or hair loss on your arms and legs    Foot sores that will not heal    Burning or aching in your feet and toes while resting (this may be worse when you lie down)    Call your local emergency number (911 in the [address]) if:   You have any of the following signs of a heart attack:      Squeezing, pressure, or pain in your chest    You may  also have any of the following:     Discomfort or pain in your back, neck, jaw, stomach, or arm    Shortness of breath    Nausea or vomiting    Lightheadedness or a sudden cold sweat    You have any of the following signs of a stroke:      Numbness or drooping on one side of your face     Weakness in an arm or leg    Confusion or difficulty speaking    Dizziness, a severe headache, or vision loss    Seek care immediately if:   You have sores or wounds that will not heal.    You notice black or discolored skin on your arm or leg. Your skin is cool to the touch. Call your doctor if:   You have leg pain when you walk 1/8 mile (200 meters) or less, even with treatment. Your legs are red, dry, or pale, even with treatment. You have questions or concerns about your condition or care. Treatment for PAD  can help reduce your risk of a heart attack, stroke, or amputation. You may need more than one of the following:  Medicines  may be given to prevent blood clots and reduce the risk of a heart attack or stroke. You may be given medicine to help prevent your PAD from getting worse. A supervised exercise program  helps you stay active in normal daily activities. Healthcare providers will help you safely walk or do strength training exercises 3 times a week for 30 to 60 minutes. You will do this for several months, then transition to walking on your own. Angioplasty  is a procedure to open your artery so blood can flow through normally. A thin tube called a catheter is used to insert a small balloon into your artery.  The balloon is inflated to open your blocked artery, and then removed. A tube called a stent may be placed in your artery to hold it open. Bypass surgery  is used to make a new connection to your artery with a vein from another part of your body, or an artificial graft. The vein or graft is attached to your artery above and below your blockage. This allows blood to flow around the blocked portion of your artery. Manage and prevent PAD:   Walk for 30 to 60 minutes at least 4 times a week. Your healthcare provider may also refer you to a supervised exercise program. The program helps increase how far you can walk without pain. It also helps you stay active in normal daily activities         Do not smoke. Nicotine and other chemicals in cigarettes and cigars can worsen PAD. They can also increase your risk for a heart attack or stroke. Ask your healthcare provider for information if you currently smoke and need help to quit. E-cigarettes or smokeless tobacco still contain nicotine. Talk to your healthcare provider before you use these products. Manage other health conditions. Take your medicines as directed and follow your healthcare provider's instructions if you have high blood pressure or high cholesterol. Perform foot care and check your blood sugar levels as directed if you have diabetes. Eat heart-healthy foods. Eat whole grains, fruits, and vegetables every day. Limit salt and high-fat foods. Ask your healthcare provider for more information on a heart healthy diet. Ask what a healthy weight is for you. Your healthcare provider can help you create a healthy weight-loss plan, if needed. Follow up with your doctor as directed:  Write down your questions so you remember to ask them during your visits. © Copyright Sondra Bashir 2022 Information is for End User's use only and may not be sold, redistributed or otherwise used for commercial purposes. The above information is an  only.  It is not intended as medical advice for individual conditions or treatments. Talk to your doctor, nurse or pharmacist before following any medical regimen to see if it is safe and effective for you.

## 2023-09-19 ENCOUNTER — APPOINTMENT (OUTPATIENT)
Dept: RADIOLOGY | Facility: CLINIC | Age: 63
End: 2023-09-19
Payer: COMMERCIAL

## 2023-09-19 ENCOUNTER — OCCMED (OUTPATIENT)
Dept: URGENT CARE | Facility: CLINIC | Age: 63
End: 2023-09-19
Payer: OTHER MISCELLANEOUS

## 2023-09-19 DIAGNOSIS — S46.912A LEFT SHOULDER STRAIN, INITIAL ENCOUNTER: Primary | ICD-10-CM

## 2023-09-19 DIAGNOSIS — S46.912A LEFT SHOULDER STRAIN, INITIAL ENCOUNTER: ICD-10-CM

## 2023-09-19 PROCEDURE — 99284 EMERGENCY DEPT VISIT MOD MDM: CPT | Performed by: FAMILY MEDICINE

## 2023-09-19 PROCEDURE — G0383 LEV 4 HOSP TYPE B ED VISIT: HCPCS | Performed by: FAMILY MEDICINE

## 2023-09-19 PROCEDURE — 73030 X-RAY EXAM OF SHOULDER: CPT

## 2023-09-19 PROCEDURE — 99213 OFFICE O/P EST LOW 20 MIN: CPT | Performed by: FAMILY MEDICINE

## 2023-09-19 NOTE — PROGRESS NOTES
This encounter was created for [facility] orders only . Left shoulder injury at work  Left shoulder Xray  Please see [facility] note for details.

## 2023-09-22 ENCOUNTER — APPOINTMENT (OUTPATIENT)
Dept: URGENT CARE | Facility: CLINIC | Age: 63
End: 2023-09-22

## 2023-09-29 ENCOUNTER — APPOINTMENT (OUTPATIENT)
Dept: URGENT CARE | Facility: CLINIC | Age: 63
End: 2023-09-29
Payer: OTHER MISCELLANEOUS

## 2023-09-29 PROCEDURE — 99214 OFFICE O/P EST MOD 30 MIN: CPT

## 2023-10-16 ENCOUNTER — APPOINTMENT (OUTPATIENT)
Dept: LAB | Facility: CLINIC | Age: 63
End: 2023-10-16
Payer: COMMERCIAL

## 2023-10-16 DIAGNOSIS — E78.5 HYPERLIPIDEMIA, UNSPECIFIED HYPERLIPIDEMIA TYPE: ICD-10-CM

## 2023-10-16 DIAGNOSIS — I10 ESSENTIAL HYPERTENSION: ICD-10-CM

## 2023-10-16 DIAGNOSIS — E55.9 VITAMIN D DEFICIENCY: ICD-10-CM

## 2023-10-16 LAB
25(OH)D3 SERPL-MCNC: 30.5 NG/ML (ref 30–100)
ALBUMIN SERPL BCP-MCNC: 3.7 G/DL (ref 3.5–5)
ALP SERPL-CCNC: 82 U/L (ref 34–104)
ALT SERPL W P-5'-P-CCNC: 22 U/L (ref 7–52)
ANION GAP SERPL CALCULATED.3IONS-SCNC: 5 MMOL/L
AST SERPL W P-5'-P-CCNC: 22 U/L (ref 13–39)
BILIRUB SERPL-MCNC: 0.43 MG/DL (ref 0.2–1)
BUN SERPL-MCNC: 9 MG/DL (ref 5–25)
CALCIUM SERPL-MCNC: 9 MG/DL (ref 8.4–10.2)
CHLORIDE SERPL-SCNC: 99 MMOL/L (ref 96–108)
CO2 SERPL-SCNC: 31 MMOL/L (ref 21–32)
CREAT SERPL-MCNC: 0.7 MG/DL (ref 0.6–1.3)
ERYTHROCYTE [DISTWIDTH] IN BLOOD BY AUTOMATED COUNT: 14 % (ref 11.6–15.1)
GFR SERPL CREATININE-BSD FRML MDRD: 93 ML/MIN/1.73SQ M
GLUCOSE P FAST SERPL-MCNC: 83 MG/DL (ref 65–99)
HCT VFR BLD AUTO: 39.6 % (ref 34.8–46.1)
HGB BLD-MCNC: 14.2 G/DL (ref 11.5–15.4)
MCH RBC QN AUTO: 34.7 PG (ref 26.8–34.3)
MCHC RBC AUTO-ENTMCNC: 35.9 G/DL (ref 31.4–37.4)
MCV RBC AUTO: 97 FL (ref 82–98)
PLATELET # BLD AUTO: 221 THOUSANDS/UL (ref 149–390)
PMV BLD AUTO: 14.7 FL (ref 8.9–12.7)
POTASSIUM SERPL-SCNC: 3.6 MMOL/L (ref 3.5–5.3)
PROT SERPL-MCNC: 6.9 G/DL (ref 6.4–8.4)
RBC # BLD AUTO: 4.09 MILLION/UL (ref 3.81–5.12)
SODIUM SERPL-SCNC: 135 MMOL/L (ref 135–147)
WBC # BLD AUTO: 5.75 THOUSAND/UL (ref 4.31–10.16)

## 2023-10-16 PROCEDURE — 85027 COMPLETE CBC AUTOMATED: CPT

## 2023-10-16 PROCEDURE — 36415 COLL VENOUS BLD VENIPUNCTURE: CPT

## 2023-10-16 PROCEDURE — 80053 COMPREHEN METABOLIC PANEL: CPT

## 2023-10-16 PROCEDURE — 82306 VITAMIN D 25 HYDROXY: CPT

## 2023-10-31 ENCOUNTER — APPOINTMENT (OUTPATIENT)
Dept: URGENT CARE | Facility: CLINIC | Age: 63
End: 2023-10-31
Payer: OTHER MISCELLANEOUS

## 2023-10-31 PROCEDURE — 99213 OFFICE O/P EST LOW 20 MIN: CPT

## 2023-11-02 ENCOUNTER — DOCUMENTATION (OUTPATIENT)
Dept: OBGYN CLINIC | Facility: CLINIC | Age: 63
End: 2023-11-02

## 2023-11-03 ENCOUNTER — OFFICE VISIT (OUTPATIENT)
Dept: OBGYN CLINIC | Facility: CLINIC | Age: 63
End: 2023-11-03

## 2023-11-03 VITALS — BODY MASS INDEX: 23.6 KG/M2 | WEIGHT: 138.2 LBS | HEIGHT: 64 IN

## 2023-11-03 DIAGNOSIS — M75.22 BICEPS TENDINITIS OF LEFT UPPER EXTREMITY: ICD-10-CM

## 2023-11-03 DIAGNOSIS — S46.912A STRAIN OF LEFT SHOULDER, INITIAL ENCOUNTER: Primary | ICD-10-CM

## 2023-11-03 PROCEDURE — 99243 OFF/OP CNSLTJ NEW/EST LOW 30: CPT | Performed by: ORTHOPAEDIC SURGERY

## 2023-11-03 NOTE — LETTER
November 6, 2023     Roland Carmichael MD  [facility]    Patient: Monty Simmons   YOB: 1960   Date of Visit: 11/3/2023       Dear Dr. Christina Knox:    Thank you for referring Trace Manzano to me for evaluation. Below are my notes for this consultation. If you have questions, please do not hesitate to call me. I look forward to following your patient along with you. Sincerely,        Nilda Rothman DO        CC: No Recipients    Nilda Rothman DO  11/3/2023 10:23 AM  Signed  ASSESSMENT/PLAN:    Diagnoses and all orders for this visit:    Strain of left shoulder, initial encounter  -     XR shoulder 2+ vw left; Future    Biceps tendinitis of left upper extremity        X-rays of the patient's left shoulder are negative for any fractures or dislocations. She does have a large subacromial spur present. An MRI of the patient's left shoulder was consistent with an intrasubstance tear of the subscapularis with mild rotator cuff tendinitis. The patient has a negative liftoff test.  It seems that she has a biceps tendinitis of her left upper extremity. Possible treatment options were discussed with the patient including continued therapy and/or corticosteroid injection. The patient declined an injection today. She should continue physical therapy to help with strengthening, stretching and range of motion. She will follow-up with our office in 6 weeks. She is acceptable to this plan. Return in about 6 weeks (around 12/15/2023). The patient has a strain of her left shoulder with bicipital tendinitis. Her MAC of injury does not support a tear of the subscapularis. There is a negative liftoff test.  Injection therapy was discussed but declined. She is already scheduled start therapy. Return back in 6 weeks evaluation. She may work modified duty.   If her condition changes, she would not hesitate to let us know      _____________________________________________________  CHIEF COMPLAINT:  Chief Complaint   Patient presents with   • Left Shoulder - Pain         SUBJECTIVE:  Kandy Nelson is a 59 y.o. female who presents to our office complaining of left shoulder pain. The patient currently works at [employer].  She states on 2023, she was trying to open a box of cheese that was weighing over 20 pounds. While pulling on a corrugated section of the box she noticed increased pain along her left shoulder. She has had pain ever since. She has been seen by occupational medicine. She states she has started therapy. She denies any numbness or tingling. She denies any fever or chills. The following portions of the patient's history were reviewed and updated as appropriate: allergies, current medications, past family history, past medical history, past social history, past surgical history and problem list.    PAST MEDICAL HISTORY:  Past Medical History:   Diagnosis Date   • Arthritis    • Coronary artery disease    • Hypercholesteremia    • Hypertension        PAST SURGICAL HISTORY:  Past Surgical History:   Procedure Laterality Date   • CORONARY STENT PLACEMENT         FAMILY HISTORY:  History reviewed. No pertinent family history.     SOCIAL HISTORY:  Social History     Tobacco Use   • Smoking status: Every Day     Packs/day: 0.50     Types: Cigarettes, E-Cigarettes     Last attempt to quit: 2019     Years since quittin.5   • Smokeless tobacco: Never   • Tobacco comments:     Now vaping   Substance Use Topics   • Alcohol use: Yes     Comment: socially   • Drug use: Never       MEDICATIONS:    Current Outpatient Medications:   •  ALPRAZolam (XANAX) 1 mg tablet, alprazolam 1 mg tablet  take 1 tablet by mouth at bedtime if needed, Disp: , Rfl:   •  aspirin (ECOTRIN LOW STRENGTH) 81 mg EC tablet, Take 81 mg by mouth daily, Disp: , Rfl:   •  atorvastatin (LIPITOR) 40 mg tablet, Take 1 tablet (40 mg total) by mouth daily, Disp: 90 tablet, Rfl: 3  •  Cholecalciferol 125 MCG (5000 UT) capsule, cholecalciferol (vitamin D3) 1,250 mcg (50,000 unit) capsule  take 1 capsule by mouth every week, Disp: , Rfl:   •  lisinopril-hydrochlorothiazide (PRINZIDE,ZESTORETIC) 20-12.5 MG per tablet, TAKE 2 TABLETS BY MOUTH EVERY DAY, Disp: 180 tablet, Rfl: 4  •  metoprolol succinate (TOPROL-XL) 25 mg 24 hr tablet, TAKE 1 TABLET (25 MG TOTAL) BY MOUTH IN THE MORNING., Disp: 90 tablet, Rfl: 4  •  pantoprazole (PROTONIX) 40 mg tablet, Take 40 mg by mouth daily, Disp: , Rfl:   •  predniSONE 5 mg tablet, Take 5 mg by mouth daily, Disp: , Rfl:   •  lisinopril (ZESTRIL) 20 mg tablet, Take 1 tablet (20 mg total) by mouth daily (Patient not taking: Reported on 11/3/2023), Disp: 90 tablet, Rfl: 4  •  predniSONE 10 mg tablet, 3 tabs daily for 3 days, 2 tabs daily for 3 days, 1 tab daily for 3 days (Patient not taking: Reported on 11/3/2023), Disp: 18 tablet, Rfl: 0    ALLERGIES:  No Known Allergies    ROS:  Review of Systems     Constitutional: Negative for fatigue, fever or loss of appetite. HENT: Negative. Respiratory: Negative for shortness of breath, dyspnea. Cardiovascular: Negative for chest pain/tightness. Gastrointestinal: Negative for abdominal pain, N/V. Endocrine: Negative for cold/heat intolerance, unexplained weight loss/gain. Genitourinary: Negative for flank pain, dysuria, hematuria. Musculoskeletal: Positive for arthralgia   Skin: Negative for rash. Neurological: Negative for numbness or tingling  Psychiatric/Behavioral: Negative for agitation. _____________________________________________________  PHYSICAL EXAMINATION:    Height 5' 4" (1.626 m), weight 62.7 kg (138 lb 3.2 oz). Constitutional: Oriented to person, place, and time. Appears well-developed and well-nourished. No distress. HENT:   Head: Normocephalic. Eyes: Conjunctivae are normal. Right eye exhibits no discharge.  Left eye exhibits no discharge. No scleral icterus. Cardiovascular: Normal rate. Pulmonary/Chest: Effort normal.   Neurological: Alert and oriented to person, place, and time. Skin: Skin is warm and dry. No rash noted. Not diaphoretic. No erythema. No pallor. Psychiatric: Normal mood and affect. Behavior is normal. Judgment and thought content normal.      MUSCULOSKELETAL EXAMINATION:   Physical Exam  Ortho Exam    Left upper extremity is neurovascularly intact  Fingers are pink and mobile  Compartments are soft  There is tenderness to palpation along biceps tendon sheath  Range of motion of the shoulder is from 0 to 170 degrees of forward flexion abduction  Rotator cuff strength 5 out of 5  Negative liftoff  Mild signs of impingement  Brisk cap refill  Sensation intact  Objective:  BP Readings from Last 1 Encounters:   09/15/23 130/76      Wt Readings from Last 1 Encounters:   11/03/23 62.7 kg (138 lb 3.2 oz)        BMI:   Estimated body mass index is 23.72 kg/m² as calculated from the following:    Height as of this encounter: 5' 4" (1.626 m). Weight as of this encounter: 62.7 kg (138 lb 3.2 oz).         Scribe Attestation      I,:  Quita Ortiz PA-C am acting as a scribe while in the presence of the attending physician.:       I,:  Tee Palencia DO personally performed the services described in this documentation    as scribed in my presence.:

## 2023-11-03 NOTE — LETTER
November 3, 2023     Patient: Edd Davis  YOB: 1960  Date of Visit: 11/3/2023      To Whom it May Concern:    Billy Mendoza is under my professional care. Theo Hearing was seen in my office on 11/3/2023. Theo Hearing may return to work with limitations which include right-handed only . If you have any questions or concerns, please don't hesitate to call.          Sincerely,          Ankush Hernandez,         CC: No Recipients

## 2023-11-03 NOTE — PROGRESS NOTES
ASSESSMENT/PLAN:    Diagnoses and all orders for this visit:    Strain of left shoulder, initial encounter  -     XR shoulder 2+ vw left; Future    Biceps tendinitis of left upper extremity        X-rays of the patient's left shoulder are negative for any fractures or dislocations. She does have a large subacromial spur present. An MRI of the patient's left shoulder was consistent with an intrasubstance tear of the subscapularis with mild rotator cuff tendinitis. The patient has a negative liftoff test.  It seems that she has a biceps tendinitis of her left upper extremity. Possible treatment options were discussed with the patient including continued therapy and/or corticosteroid injection. The patient declined an injection today. She should continue physical therapy to help with strengthening, stretching and range of motion. She will follow-up with our office in 6 weeks. She is acceptable to this plan. Return in about 6 weeks (around 12/15/2023). The patient has a strain of her left shoulder with bicipital tendinitis. Her MAC of injury does not support a tear of the subscapularis. There is a negative liftoff test.  Injection therapy was discussed but declined. She is already scheduled start therapy. Return back in 6 weeks evaluation. She may work modified duty. If her condition changes, she would not hesitate to let us know      _____________________________________________________  CHIEF COMPLAINT:  Chief Complaint   Patient presents with    Left Shoulder - Pain         SUBJECTIVE:  Shauna Drew is a 59 y.o. female who presents to our office complaining of left shoulder pain. The patient currently works at [employer].  She states on 9/18/2023, she was trying to open a box of cheese that was weighing over 20 pounds. While pulling on a corrugated section of the box she noticed increased pain along her left shoulder. She has had pain ever since.   She has been seen by occupational medicine. She states she has started therapy. She denies any numbness or tingling. She denies any fever or chills. The following portions of the patient's history were reviewed and updated as appropriate: allergies, current medications, past family history, past medical history, past social history, past surgical history and problem list.    PAST MEDICAL HISTORY:  Past Medical History:   Diagnosis Date    Arthritis     Coronary artery disease     Hypercholesteremia     Hypertension        PAST SURGICAL HISTORY:  Past Surgical History:   Procedure Laterality Date    CORONARY STENT PLACEMENT         FAMILY HISTORY:  History reviewed. No pertinent family history.     SOCIAL HISTORY:  Social History     Tobacco Use    Smoking status: Every Day     Packs/day: 0.50     Types: Cigarettes, E-Cigarettes     Last attempt to quit: 2019     Years since quittin.5    Smokeless tobacco: Never    Tobacco comments:     Now vaping   Substance Use Topics    Alcohol use: Yes     Comment: socially    Drug use: Never       MEDICATIONS:    Current Outpatient Medications:     ALPRAZolam (XANAX) 1 mg tablet, alprazolam 1 mg tablet  take 1 tablet by mouth at bedtime if needed, Disp: , Rfl:     aspirin (ECOTRIN LOW STRENGTH) 81 mg EC tablet, Take 81 mg by mouth daily, Disp: , Rfl:     atorvastatin (LIPITOR) 40 mg tablet, Take 1 tablet (40 mg total) by mouth daily, Disp: 90 tablet, Rfl: 3    Cholecalciferol 125 MCG (5000 UT) capsule, cholecalciferol (vitamin D3) 1,250 mcg (50,000 unit) capsule  take 1 capsule by mouth every week, Disp: , Rfl:     lisinopril-hydrochlorothiazide (PRINZIDE,ZESTORETIC) 20-12.5 MG per tablet, TAKE 2 TABLETS BY MOUTH EVERY DAY, Disp: 180 tablet, Rfl: 4    metoprolol succinate (TOPROL-XL) 25 mg 24 hr tablet, TAKE 1 TABLET (25 MG TOTAL) BY MOUTH IN THE MORNING., Disp: 90 tablet, Rfl: 4    pantoprazole (PROTONIX) 40 mg tablet, Take 40 mg by mouth daily, Disp: , Rfl:     predniSONE 5 mg tablet, Take 5 mg by mouth daily, Disp: , Rfl:     lisinopril (ZESTRIL) 20 mg tablet, Take 1 tablet (20 mg total) by mouth daily (Patient not taking: Reported on 11/3/2023), Disp: 90 tablet, Rfl: 4    predniSONE 10 mg tablet, 3 tabs daily for 3 days, 2 tabs daily for 3 days, 1 tab daily for 3 days (Patient not taking: Reported on 11/3/2023), Disp: 18 tablet, Rfl: 0    ALLERGIES:  No Known Allergies    ROS:  Review of Systems     Constitutional: Negative for fatigue, fever or loss of appetite. HENT: Negative. Respiratory: Negative for shortness of breath, dyspnea. Cardiovascular: Negative for chest pain/tightness. Gastrointestinal: Negative for abdominal pain, N/V. Endocrine: Negative for cold/heat intolerance, unexplained weight loss/gain. Genitourinary: Negative for flank pain, dysuria, hematuria. Musculoskeletal: Positive for arthralgia   Skin: Negative for rash. Neurological: Negative for numbness or tingling  Psychiatric/Behavioral: Negative for agitation. _____________________________________________________  PHYSICAL EXAMINATION:    Height 5' 4" (1.626 m), weight 62.7 kg (138 lb 3.2 oz). Constitutional: Oriented to person, place, and time. Appears well-developed and well-nourished. No distress. HENT:   Head: Normocephalic. Eyes: Conjunctivae are normal. Right eye exhibits no discharge. Left eye exhibits no discharge. No scleral icterus. Cardiovascular: Normal rate. Pulmonary/Chest: Effort normal.   Neurological: Alert and oriented to person, place, and time. Skin: Skin is warm and dry. No rash noted. Not diaphoretic. No erythema. No pallor. Psychiatric: Normal mood and affect.  Behavior is normal. Judgment and thought content normal.      MUSCULOSKELETAL EXAMINATION:   Physical Exam  Ortho Exam    Left upper extremity is neurovascularly intact  Fingers are pink and mobile  Compartments are soft  There is tenderness to palpation along biceps tendon sheath  Range of motion of the shoulder is from 0 to 170 degrees of forward flexion abduction  Rotator cuff strength 5 out of 5  Negative liftoff  Mild signs of impingement  Brisk cap refill  Sensation intact  Objective:  BP Readings from Last 1 Encounters:   09/15/23 130/76      Wt Readings from Last 1 Encounters:   11/03/23 62.7 kg (138 lb 3.2 oz)        BMI:   Estimated body mass index is 23.72 kg/m² as calculated from the following:    Height as of this encounter: 5' 4" (1.626 m). Weight as of this encounter: 62.7 kg (138 lb 3.2 oz).         Scribe Attestation      I,:  Mari Lopez PA-C am acting as a scribe while in the presence of the attending physician.:       I,:  Chaya Self DO personally performed the services described in this documentation    as scribed in my presence.:

## 2023-11-09 ENCOUNTER — TELEPHONE (OUTPATIENT)
Age: 63
End: 2023-11-09

## 2023-11-09 NOTE — TELEPHONE ENCOUNTER
Caller: Sergio manager    Doctor: Radha Crump    Reason for call: she has questions about her work note from 11/3/23.   She is looking for clarification about her right hand    Call back#: [phone]

## 2023-11-09 NOTE — TELEPHONE ENCOUNTER
Caller: Cori pacheco    Doctor/Office: Remington Brain    CB#: [phone]      What needs to be faxed: 11/3 OVN and work note    ATTN to: Cori Gordon    Fax#: [phone]       Documents were successfully e-faxed

## 2023-11-17 NOTE — TELEPHONE ENCOUNTER
Caller: Vashti Meckel nurse  for Disability Care Management    Doctor: Eliud Gonzalez    Reason for call:     Vashti Meckel is calling to speak to the nurse about this patient, she is returning [place] call. Please give her a call back. ..     Call back#: [phone]

## 2023-11-17 NOTE — TELEPHONE ENCOUNTER
CLM on [facility] Manger Disability to return call to inquire as to her questions about work note for pt on right hand and what she needs clarified by Dr Jonathan Pascual? Please get more info. Thank you. Await CB.

## 2023-12-11 ENCOUNTER — TELEPHONE (OUTPATIENT)
Dept: VASCULAR SURGERY | Facility: CLINIC | Age: 63
End: 2023-12-11

## 2023-12-15 ENCOUNTER — OFFICE VISIT (OUTPATIENT)
Dept: OBGYN CLINIC | Facility: CLINIC | Age: 63
End: 2023-12-15

## 2023-12-15 VITALS
BODY MASS INDEX: 23.56 KG/M2 | SYSTOLIC BLOOD PRESSURE: 152 MMHG | DIASTOLIC BLOOD PRESSURE: 91 MMHG | HEIGHT: 64 IN | WEIGHT: 138 LBS | HEART RATE: 57 BPM

## 2023-12-15 DIAGNOSIS — M75.82 TENDINITIS OF LEFT ROTATOR CUFF: ICD-10-CM

## 2023-12-15 DIAGNOSIS — S46.812A PARTIAL TEAR OF LEFT SUBSCAPULARIS TENDON, INITIAL ENCOUNTER: Primary | ICD-10-CM

## 2023-12-15 PROCEDURE — 99213 OFFICE O/P EST LOW 20 MIN: CPT | Performed by: ORTHOPAEDIC SURGERY

## 2023-12-15 NOTE — PROGRESS NOTES
ASSESSMENT/PLAN:    Diagnoses and all orders for this visit:    Partial tear of left subscapularis tendon, initial encounter    Tendinitis of left rotator cuff        An MRI of the patient's left shoulder is consistent with intrasubstance tear of the subscapularis. There is also mild rotator cuff tendinitis and bursitis. Possible treatment options were discussed at length with the patient which included corticosteroid injection, physical therapy and/or referral to shoulder specialist.  The patient would like to proceed with conservative treatment with includes continued physical therapy. She will follow-up with our office in 6 weeks. The patient is acceptable to this plan. Return in about 6 weeks (around 1/26/2024). The patient has intrasubstance tearing of the subscapularis tendon. This is related to tendinosis and not the mechanism of injury she sustained, that is pulling straight backwards. Injection therapy was discussed but declined. She wants to continue with therapy since it is helping. She was instructed work with restrictions. A second opinion was also discussed but deferred for now. Return back in 6 weeks reevaluation    _____________________________________________________  CHIEF COMPLAINT:  Chief Complaint   Patient presents with    Left Shoulder - Follow-up         SUBJECTIVE:  Neo Cui is a 59 y.o. female who presents to our office to review MRI results of her left shoulder. She complains of left shoulder pain. She states that her symptoms are starting to improve with physical therapy. She denies any fever or chills.     The following portions of the patient's history were reviewed and updated as appropriate: allergies, current medications, past family history, past medical history, past social history, past surgical history and problem list.    PAST MEDICAL HISTORY:  Past Medical History:   Diagnosis Date    Arthritis     Coronary artery disease     Hypercholesteremia Hypertension        PAST SURGICAL HISTORY:  Past Surgical History:   Procedure Laterality Date    CORONARY STENT PLACEMENT         FAMILY HISTORY:  History reviewed. No pertinent family history.     SOCIAL HISTORY:  Social History     Tobacco Use    Smoking status: Every Day     Current packs/day: 0.00     Types: Cigarettes, E-Cigarettes     Last attempt to quit: 2019     Years since quittin.7    Smokeless tobacco: Never    Tobacco comments:     Now vaping   Vaping Use    Vaping status: Never Used   Substance Use Topics    Alcohol use: Yes     Comment: socially    Drug use: Never       MEDICATIONS:    Current Outpatient Medications:     ALPRAZolam (XANAX) 1 mg tablet, alprazolam 1 mg tablet  take 1 tablet by mouth at bedtime if needed, Disp: , Rfl:     aspirin (ECOTRIN LOW STRENGTH) 81 mg EC tablet, Take 81 mg by mouth daily, Disp: , Rfl:     atorvastatin (LIPITOR) 40 mg tablet, Take 1 tablet (40 mg total) by mouth daily, Disp: 90 tablet, Rfl: 3    Cholecalciferol 125 MCG (5000 UT) capsule, cholecalciferol (vitamin D3) 1,250 mcg (50,000 unit) capsule  take 1 capsule by mouth every week, Disp: , Rfl:     lisinopril-hydrochlorothiazide (PRINZIDE,ZESTORETIC) 20-12.5 MG per tablet, TAKE 2 TABLETS BY MOUTH EVERY DAY, Disp: 180 tablet, Rfl: 4    metoprolol succinate (TOPROL-XL) 25 mg 24 hr tablet, TAKE 1 TABLET (25 MG TOTAL) BY MOUTH IN THE MORNING., Disp: 90 tablet, Rfl: 4    pantoprazole (PROTONIX) 40 mg tablet, Take 40 mg by mouth daily, Disp: , Rfl:     predniSONE 5 mg tablet, Take 5 mg by mouth daily, Disp: , Rfl:     lisinopril (ZESTRIL) 20 mg tablet, Take 1 tablet (20 mg total) by mouth daily (Patient not taking: Reported on 11/3/2023), Disp: 90 tablet, Rfl: 4    predniSONE 10 mg tablet, 3 tabs daily for 3 days, 2 tabs daily for 3 days, 1 tab daily for 3 days (Patient not taking: Reported on 11/3/2023), Disp: 18 tablet, Rfl: 0    ALLERGIES:  No Known Allergies    ROS:  Review of Systems Constitutional: Negative for fatigue, fever or loss of appetite. HENT: Negative. Respiratory: Negative for shortness of breath, dyspnea. Cardiovascular: Negative for chest pain/tightness. Gastrointestinal: Negative for abdominal pain, N/V. Endocrine: Negative for cold/heat intolerance, unexplained weight loss/gain. Genitourinary: Negative for flank pain, dysuria, hematuria. Musculoskeletal: Positive for arthralgia   Skin: Negative for rash. Neurological: Negative for numbness or tingling  Psychiatric/Behavioral: Negative for agitation. _____________________________________________________  PHYSICAL EXAMINATION:    Blood pressure 152/91, pulse 57, height 5' 4" (1.626 m), weight 62.6 kg (138 lb). Constitutional: Oriented to person, place, and time. Appears well-developed and well-nourished. No distress. HENT:   Head: Normocephalic. Eyes: Conjunctivae are normal. Right eye exhibits no discharge. Left eye exhibits no discharge. No scleral icterus. Cardiovascular: Normal rate. Pulmonary/Chest: Effort normal.   Neurological: Alert and oriented to person, place, and time. Skin: Skin is warm and dry. No rash noted. Not diaphoretic. No erythema. No pallor. Psychiatric: Normal mood and affect. Behavior is normal. Judgment and thought content normal.      MUSCULOSKELETAL EXAMINATION:   Physical Exam  Ortho Exam    Left upper extremity is neurovascularly intact  Fingers are pink and mobile  Compartments are soft  Range of motion of the shoulder is from 0 to 140 degrees of forward flexion abduction  Rotator cuff strength 4 and half out of 5  Negative liftoff test  Brisk cap refill  Sensation intact    Objective:  BP Readings from Last 1 Encounters:   12/15/23 152/91      Wt Readings from Last 1 Encounters:   12/15/23 62.6 kg (138 lb)        BMI:   Estimated body mass index is 23.69 kg/m² as calculated from the following:    Height as of this encounter: 5' 4" (1.626 m).     Weight as of this encounter: 62.6 kg (138 lb).         Scribe Attestation      I,:  Angeli Leach PA-C am acting as a scribe while in the presence of the attending physician.:       I,:  Mimi Ross,  personally performed the services described in this documentation    as scribed in my presence.:

## 2023-12-20 ENCOUNTER — HOSPITAL ENCOUNTER (OUTPATIENT)
Dept: NON INVASIVE DIAGNOSTICS | Facility: HOSPITAL | Age: 63
Discharge: HOME | End: 2023-12-20
Payer: COMMERCIAL

## 2023-12-20 DIAGNOSIS — I73.9 PVD (PERIPHERAL VASCULAR DISEASE) WITH CLAUDICATION (HCC): ICD-10-CM

## 2023-12-20 PROCEDURE — 93923 UPR/LXTR ART STDY 3+ LVLS: CPT

## 2023-12-20 PROCEDURE — 93925 LOWER EXTREMITY STUDY: CPT | Performed by: SURGERY

## 2023-12-20 PROCEDURE — 93922 UPR/L XTREMITY ART 2 LEVELS: CPT | Performed by: SURGERY

## 2023-12-20 PROCEDURE — 93925 LOWER EXTREMITY STUDY: CPT

## 2023-12-21 ENCOUNTER — TELEPHONE (OUTPATIENT)
Dept: VASCULAR SURGERY | Facility: CLINIC | Age: 63
End: 2023-12-21

## 2023-12-21 DIAGNOSIS — I73.9 PVD (PERIPHERAL VASCULAR DISEASE) WITH CLAUDICATION (HCC): Primary | ICD-10-CM

## 2023-12-21 RX ORDER — CLOPIDOGREL BISULFATE 75 MG/1
75 TABLET ORAL DAILY
Qty: 30 TABLET | Refills: 2 | Status: SHIPPED | OUTPATIENT
Start: 2023-12-21

## 2023-12-21 NOTE — TELEPHONE ENCOUNTER
Brianna Wolff PA-C  [id number] hours ago (8:43 PM)     RD  Patient called 12/20/23 545 PM. We reviewed KURTIS and KURTIS.  We discussed that office will call to schedule OV. -RD     
Called patient and moved up OV to 12/28/23  
D/w Brianna Wolff. Plan is to start Plavix in addition to aspirin.  Please send prescription to preferred pharmacy: Plavix 75mg PO QD and route for signing.  Thank you  
Patient called, she is having worsening symptoms. , she said she experiences foot numness at night and when she dangles her foot, it goes away,  we will set patient for a sooner doppler to see if there are changes and then an appt with [facility]. I told patient that if her leg gets cold or extremity gets extrememly  painful, to go to ER. She understood.
Per BG Zhang , patient has worsening PAD and worsening BRANDON, [facility] [place] [facility] .  Likely needs an angiogram RLE .  If foot discoloration, constant pain, patient should report to ER ,  Continue ASA, Plavix and statin., Amanda will call patient.    
Please call pt to move up OV asap, thank you.  
Pt called asking if Plavix rx was sent to pharmacy, nurse she s/w last night said they were ordering Plavix.    Per note pt was advised to continue asa, Plavix and statin.  Pt states she has not taken Plavix for several years.  I do not see rx in chart for Plavix.   If she is to take Plavix she will need rx sent to [facility] in [place].  Pt is now scheduled for ov 12/28/23.    Advised I would send message to our triage provider re: Plavix rx. Please advise.   
Pt notified rx sent to [facility] in [place]  
Received call from Pat at the [place] [facility] regarding KURTIS results from today. Per Pat, the RLE is worse than prior. Results are in consensus for review. There is now occlusion vs high grade stenosis of the proximal and mid SFA w/ reconstitution at the distal portion. BRANDON is 0.23 (previously 0.61). Pt has OV 1/5 w/ Joslyn. Please advise.  
Rx routed to sign  
done  
7.67

## 2023-12-27 NOTE — PROGRESS NOTES
63-year-old female, 1/2 ppd smoker, with past medical history significant for CAD s/p stent, HLD, HTN.  She presents today for results review and follow-up on right lower extremity calf claudication after 0.5 miles.    Assessment/Plan:    PVD (peripheral vascular disease) with claudication (HCC)  Pt returns to the office with c/o right calf claudication with 1/2 city block    LEAD reviewed from 12/20/23  RLL: monophasic CFA waveforms with occlusion versus high grade stenosis of the proximal and mid SFA with reconstitution at the distal portion.  BRANDON: 0.23 (Prior:0.61 )/ MP 45 / GTP 13      LLL: occlusion versus high grade stenosis of the proximal to mid SFA w reconstitution at the distal portion.  BRANDON: 0.61 (Prior :0.63)/ MP 40 mmHg/ GTP 45mmHg    -Reports occasional r calf pain at night, Denies wounds/ tissue loss.   -Non-palpable B/L pedal pulses. B/L feet are warm with dependant red rubor discoloration in the b/l toes, no tenderness to the touch, no wounds.     -Reviewed lower extremity ultrasound in detail with pt and answered all questions. Educated pt on peripheral arterial disease and indication for vascular intervention. No indications for vascular intervention at this time. Recommending smoking cessation, we discussed the importance of smoking cessation, pt is motivated to quit. Will order AOIL and with return to the office for review with a vascular surgeon. Discussed continued medical management with daily foot checks. Pt verbalized understanding.    Recommendations:  -Quit smoking, smoking cessation referral provided today, complete AOIL and return to the office for review with a vascular surgeon.   -Call the office with any new or worsening leg pain, discoloration, swelling, new wounds/ tissue loss.  -Continue to take aspirin 81 mg and plavix daily.  -Continue to take atorvastatin daily as per PCP.  -Do daily foot checks, food protection, wear well fitted shoes. Close follow up with podiatry.  "  -Increase daily walking for 20-30 min everyday.      Essential hypertension  -BP well controlled  -continue current medical regimen  -management per PCP      Hypercholesterolemia  -Elevated triglycerides  -continue statin medication  -continue routine follow-up with family doctor      Tobacco abuse  In-depth discussion on the importance of smoking cessation.  Reviewed pathophysiology of nicotine on the peripheral arterial system and increased rates of reocclusion with vascular intervention.  reports getting prescribed nicotine patch by PCP, referral excepted to smoking cessation program today.          Diagnoses and all orders for this visit:    Tobacco abuse  -     Ambulatory Referral to Smoking Cessation Program; Future  -     VAS ABDOMINAL AORTA/ILIACS; WITH BRANDON'S; Future    PVD (peripheral vascular disease) with claudication (HCC)  -     Cancel: VAS lower limb arterial duplex, complete bilateral; Future  -     Ambulatory Referral to Smoking Cessation Program; Future  -     VAS ABDOMINAL AORTA/ILIACS; WITH BRANDON'S; Future          Subjective:      Patient ID: Nancie Noyola is a 63 y.o. female.    Patient presents today to review KURTIS done 12/20/23. Reports \"shooting pains\" in R foot at night. Denies redness and numbness. Also reports R calf cramping/tightening walking short distances.      63-year-old female, smoker, with past medical history significant for CAD s/p stent, HLD, HTN, tobacco dependence.  She presents today for results review and 6-month follow-up on right lower extremity calf claudication after less than a city block. States that this pain started in July when she took her grandchildren to [place] [employer]. Describes pain as cramping in the calf, that stops after she rests. Denies rest pain, denies wounds/ tissue. States at night she occasionally gets a sharp shooting pain in the great hallux.  She continues to smoke 1/2 ppd. She states she saw her family doctor yesterday and he prescribed the patch, " "which she did not  yet.   Reports compliance with atorvastatin, aspirin and plavix.         The following portions of the patient's history were reviewed and updated as appropriate: allergies, current medications, past family history, past medical history, past social history, past surgical history, and problem list.    Review of Systems   Constitutional: Negative.    HENT: Negative.     Eyes: Negative.    Respiratory: Negative.  Negative for shortness of breath.    Cardiovascular: Negative.  Negative for chest pain and leg swelling.   Gastrointestinal: Negative.    Endocrine: Negative.    Genitourinary: Negative.    Musculoskeletal:         Leg cramping with walking    Skin: Negative.    Allergic/Immunologic: Negative.    Hematological: Negative.    Psychiatric/Behavioral: Negative.           Objective:      /86 (BP Location: Right arm, Patient Position: Sitting)   Pulse (!) 52   Ht 5' 4\" (1.626 m)   Wt 62.6 kg (138 lb)   BMI 23.69 kg/m²          Physical Exam  Vitals and nursing note reviewed.   Constitutional:       Appearance: Normal appearance. She is normal weight.   HENT:      Head: Normocephalic and atraumatic.   Neck:      Vascular: No carotid bruit.   Cardiovascular:      Rate and Rhythm: Normal rate and regular rhythm.      Pulses:           Femoral pulses are 2+ on the right side and 0 on the left side.       Dorsalis pedis pulses are 0 on the right side and 0 on the left side.        Posterior tibial pulses are 0 on the right side and 0 on the left side.      Heart sounds: Normal heart sounds. No murmur heard.  Pulmonary:      Effort: Pulmonary effort is normal. No respiratory distress.      Breath sounds: Normal breath sounds.   Musculoskeletal:      Cervical back: Normal range of motion.   Skin:     General: Skin is warm and dry.      Capillary Refill: Capillary refill takes 2 to 3 seconds.   Neurological:      General: No focal deficit present.      Mental Status: She is alert and " "oriented to person, place, and time.      Cranial Nerves: No cranial nerve deficit.      Sensory: No sensory deficit.   Psychiatric:         Mood and Affect: Mood normal.         Behavior: Behavior normal.           I have reviewed and made appropriate changes to the review of systems input by the medical assistant.    Vitals:    23 0825   BP: 142/86   BP Location: Right arm   Patient Position: Sitting   Pulse: (!) 52   Weight: 62.6 kg (138 lb)   Height: 5' 4\" (1.626 m)       Patient Active Problem List   Diagnosis    Coronary artery disease involving native coronary artery of native heart without angina pectoris    Hypercholesterolemia    Essential hypertension    Tobacco abuse    PVD (peripheral vascular disease) with claudication (HCC)       Past Surgical History:   Procedure Laterality Date    CORONARY STENT PLACEMENT         History reviewed. No pertinent family history.    Social History     Socioeconomic History    Marital status: /Civil Union     Spouse name: Not on file    Number of children: Not on file    Years of education: Not on file    Highest education level: Not on file   Occupational History    Not on file   Tobacco Use    Smoking status: Every Day     Current packs/day: 0.00     Types: Cigarettes, E-Cigarettes     Last attempt to quit: 2019     Years since quittin.7    Smokeless tobacco: Never    Tobacco comments:     Now vaping   Vaping Use    Vaping status: Never Used   Substance and Sexual Activity    Alcohol use: Yes     Comment: socially    Drug use: Never    Sexual activity: Not on file   Other Topics Concern    Not on file   Social History Narrative    Not on file     Social Determinants of Health     Financial Resource Strain: Not on file   Food Insecurity: Not on file   Transportation Needs: Not on file   Physical Activity: Not on file   Stress: Not on file   Social Connections: Not on file   Intimate Partner Violence: Not on file   Housing Stability: Not on file "       No Known Allergies      Current Outpatient Medications:     ALPRAZolam (XANAX) 1 mg tablet, alprazolam 1 mg tablet  take 1 tablet by mouth at bedtime if needed, Disp: , Rfl:     aspirin (ECOTRIN LOW STRENGTH) 81 mg EC tablet, Take 81 mg by mouth daily, Disp: , Rfl:     atorvastatin (LIPITOR) 40 mg tablet, Take 1 tablet (40 mg total) by mouth daily, Disp: 90 tablet, Rfl: 3    Cholecalciferol 125 MCG (5000 UT) capsule, cholecalciferol (vitamin D3) 1,250 mcg (50,000 unit) capsule  take 1 capsule by mouth every week, Disp: , Rfl:     clopidogrel (Plavix) 75 mg tablet, Take 1 tablet (75 mg total) by mouth daily, Disp: 30 tablet, Rfl: 2    lisinopril-hydrochlorothiazide (PRINZIDE,ZESTORETIC) 20-12.5 MG per tablet, TAKE 2 TABLETS BY MOUTH EVERY DAY, Disp: 180 tablet, Rfl: 4    metoprolol succinate (TOPROL-XL) 25 mg 24 hr tablet, TAKE 1 TABLET (25 MG TOTAL) BY MOUTH IN THE MORNING., Disp: 90 tablet, Rfl: 4    pantoprazole (PROTONIX) 40 mg tablet, Take 40 mg by mouth daily, Disp: , Rfl:     predniSONE 10 mg tablet, 3 tabs daily for 3 days, 2 tabs daily for 3 days, 1 tab daily for 3 days, Disp: 18 tablet, Rfl: 0    predniSONE 5 mg tablet, Take 5 mg by mouth daily, Disp: , Rfl:     lisinopril (ZESTRIL) 20 mg tablet, Take 1 tablet (20 mg total) by mouth daily (Patient not taking: Reported on 12/28/2023), Disp: 90 tablet, Rfl: 4  I have spent a total time of 30 minutes on 12/28/23 in caring for this patient including Diagnostic results, Risks and benefits of tx options, Instructions for management, Patient and family education, Documenting in the medical record, Reviewing / ordering tests, medicine, procedures  , and Obtaining or reviewing history  .

## 2023-12-28 ENCOUNTER — PATIENT OUTREACH (OUTPATIENT)
Dept: OTHER | Facility: CLINIC | Age: 63
End: 2023-12-28

## 2023-12-28 ENCOUNTER — OFFICE VISIT (OUTPATIENT)
Dept: VASCULAR SURGERY | Facility: CLINIC | Age: 63
End: 2023-12-28
Payer: COMMERCIAL

## 2023-12-28 VITALS
BODY MASS INDEX: 23.56 KG/M2 | DIASTOLIC BLOOD PRESSURE: 86 MMHG | HEART RATE: 52 BPM | SYSTOLIC BLOOD PRESSURE: 142 MMHG | HEIGHT: 64 IN | WEIGHT: 138 LBS

## 2023-12-28 DIAGNOSIS — I73.9 PVD (PERIPHERAL VASCULAR DISEASE) WITH CLAUDICATION (HCC): ICD-10-CM

## 2023-12-28 DIAGNOSIS — Z72.0 TOBACCO ABUSE: Primary | ICD-10-CM

## 2023-12-28 PROCEDURE — 99214 OFFICE O/P EST MOD 30 MIN: CPT | Performed by: NURSE PRACTITIONER

## 2023-12-28 NOTE — ASSESSMENT & PLAN NOTE
In-depth discussion on the importance of smoking cessation.  Reviewed pathophysiology of nicotine on the peripheral arterial system and increased rates of reocclusion with vascular intervention.  reports getting prescribed nicotine patch by PCP, referral excepted to smoking cessation program today.

## 2023-12-28 NOTE — ASSESSMENT & PLAN NOTE
Pt returns to the office with c/o right calf claudication with 1/2 city block    LEAD reviewed from 12/20/23  RLL: monophasic CFA waveforms with occlusion versus high grade stenosis of the proximal and mid SFA with reconstitution at the distal portion.  BRANDON: 0.23 (Prior:0.61 )/ MP 45 / GTP 13      LLL: occlusion versus high grade stenosis of the proximal to mid SFA w reconstitution at the distal portion.  BRANDON: 0.61 (Prior :0.63)/ MP 40 mmHg/ GTP 45mmHg    -Reports occasional r calf pain at night, Denies wounds/ tissue loss.   -Non-palpable B/L pedal pulses. B/L feet are warm with dependant red rubor discoloration in the b/l toes, no tenderness to the touch, no wounds.     -Reviewed lower extremity ultrasound in detail with pt and answered all questions. Educated pt on peripheral arterial disease and indication for vascular intervention. No indications for vascular intervention at this time. Recommending smoking cessation, we discussed the importance of smoking cessation, pt is motivated to quit. Will order AOIL and with return to the office for review with a vascular surgeon. Discussed continued medical management with daily foot checks. Pt verbalized understanding.    Recommendations:  -Quit smoking, smoking cessation referral provided today, complete AOIL and return to the office for review with a vascular surgeon.   -Call the office with any new or worsening leg pain, discoloration, swelling, new wounds/ tissue loss.  -Continue to take aspirin 81 mg and plavix daily.  -Continue to take atorvastatin daily as per PCP.  -Do daily foot checks, food protection, wear well fitted shoes. Close follow up with podiatry.   -Increase daily walking for 20-30 min everyday.

## 2023-12-28 NOTE — PATIENT INSTRUCTIONS
-Complete AOIL in Februarys and return to the office for review with a vascular surgeon.     -Continue to take ASA, plavix and atorvastatin daily.    -Quit smoking, referral to smoking cessation provided today.    -Call the office with any new or worsening leg pain, discoloration, wounds/ tissue loss.

## 2023-12-28 NOTE — ASSESSMENT & PLAN NOTE
-Elevated triglycerides  -continue statin medication  -continue routine follow-up with family doctor

## 2024-01-08 ENCOUNTER — TELEPHONE (OUTPATIENT)
Dept: OBGYN CLINIC | Facility: MEDICAL CENTER | Age: 64
End: 2024-01-08

## 2024-01-08 NOTE — TELEPHONE ENCOUNTER
Caller: Mariia     Doctor/Office: Gayathri    CB#: [phone]      What needs to be faxed: OV Note from 12/15/23    ATTN to: MARIIA     Fax#: [phone]    Documents were successfully e-faxed

## 2024-01-26 ENCOUNTER — OFFICE VISIT (OUTPATIENT)
Dept: OBGYN CLINIC | Facility: CLINIC | Age: 64
End: 2024-01-26
Payer: OTHER MISCELLANEOUS

## 2024-01-26 VITALS
HEART RATE: 67 BPM | BODY MASS INDEX: 23.56 KG/M2 | WEIGHT: 138 LBS | SYSTOLIC BLOOD PRESSURE: 129 MMHG | DIASTOLIC BLOOD PRESSURE: 73 MMHG | HEIGHT: 64 IN

## 2024-01-26 DIAGNOSIS — S46.812A PARTIAL TEAR OF LEFT SUBSCAPULARIS TENDON, INITIAL ENCOUNTER: Primary | ICD-10-CM

## 2024-01-26 DIAGNOSIS — M75.82 TENDINITIS OF LEFT ROTATOR CUFF: ICD-10-CM

## 2024-01-26 PROCEDURE — 99213 OFFICE O/P EST LOW 20 MIN: CPT | Performed by: ORTHOPAEDIC SURGERY

## 2024-01-26 PROCEDURE — 20610 DRAIN/INJ JOINT/BURSA W/O US: CPT | Performed by: ORTHOPAEDIC SURGERY

## 2024-01-26 RX ORDER — TRIAMCINOLONE ACETONIDE 40 MG/ML
80 INJECTION, SUSPENSION INTRA-ARTICULAR; INTRAMUSCULAR
Status: COMPLETED | OUTPATIENT
Start: 2024-01-26 | End: 2024-01-26

## 2024-01-26 RX ORDER — BUPIVACAINE HYDROCHLORIDE 2.5 MG/ML
4 INJECTION, SOLUTION INFILTRATION; PERINEURAL
Status: COMPLETED | OUTPATIENT
Start: 2024-01-26 | End: 2024-01-26

## 2024-01-26 RX ADMIN — TRIAMCINOLONE ACETONIDE 80 MG: 40 INJECTION, SUSPENSION INTRA-ARTICULAR; INTRAMUSCULAR at 10:30

## 2024-01-26 RX ADMIN — BUPIVACAINE HYDROCHLORIDE 4 ML: 2.5 INJECTION, SOLUTION INFILTRATION; PERINEURAL at 10:30

## 2024-01-26 NOTE — LETTER
January 26, 2024     Patient: Nancie Noyola  YOB: 1960  Date of Visit: 1/26/2024      To Whom it May Concern:    Nancie Noyola is under my professional care. Nancie was seen in my office on 1/26/2024. Nancie may return to work with limitations which include restricted movement of the left arm to shoulder level .    If you have any questions or concerns, please don't hesitate to call.         Sincerely,          Sulaiman Trinh DO        CC: No Recipients

## 2024-01-26 NOTE — PROGRESS NOTES
ASSESSMENT/PLAN:    Diagnoses and all orders for this visit:    Partial tear of left subscapularis tendon, initial encounter    Tendinitis of left rotator cuff    Other orders  -     Large joint arthrocentesis        The patient was seen and examined.  She is still having discomfort along her left shoulder especially with forward flexion at 45 degrees.  Possible treatment options were discussed with the patient.  Her left shoulder was injected with Kenalog and Marcaine.  She tolerated the injection quite well.  She will follow-up with our office in 6 weeks.  The patient is acceptable to this plan.    Return in about 6 weeks (around 3/8/2024).    The patient has signs of impingement and tendinitis of her left shoulder.  The subacromial joint space was injected with Kenalog and Marcaine.  She tolerated procedure quite well.  Return back 6 weeks evaluation.  Lifting capacity was increased on the left side  _____________________________________________________  CHIEF COMPLAINT:  Chief Complaint   Patient presents with    Left Shoulder - Follow-up         SUBJECTIVE:  Nancie Noyola is a 63 y.o. female who presents to our office for follow-up visit.  Patient has a history of an intrasubstance tear of the subscapularis, with rotator cuff tendinitis and bursitis.  The patient states that her shoulder is still giving her discomfort.  She notes increased pain with lifting.  She denies any fever or chills.    The following portions of the patient's history were reviewed and updated as appropriate: allergies, current medications, past family history, past medical history, past social history, past surgical history and problem list.    PAST MEDICAL HISTORY:  Past Medical History:   Diagnosis Date    Arthritis     Coronary artery disease     Hypercholesteremia     Hypertension        PAST SURGICAL HISTORY:  Past Surgical History:   Procedure Laterality Date    CORONARY STENT PLACEMENT  2002       FAMILY HISTORY:  History  reviewed. No pertinent family history.    SOCIAL HISTORY:  Social History     Tobacco Use    Smoking status: Every Day     Current packs/day: 0.00     Types: Cigarettes, E-Cigarettes     Last attempt to quit: 2019     Years since quittin.8    Smokeless tobacco: Never    Tobacco comments:     Now vaping   Vaping Use    Vaping status: Never Used   Substance Use Topics    Alcohol use: Yes     Comment: socially    Drug use: Never       MEDICATIONS:    Current Outpatient Medications:     ALPRAZolam (XANAX) 1 mg tablet, alprazolam 1 mg tablet  take 1 tablet by mouth at bedtime if needed, Disp: , Rfl:     aspirin (ECOTRIN LOW STRENGTH) 81 mg EC tablet, Take 81 mg by mouth daily, Disp: , Rfl:     atorvastatin (LIPITOR) 40 mg tablet, Take 1 tablet (40 mg total) by mouth daily, Disp: 90 tablet, Rfl: 3    Cholecalciferol 125 MCG (5000 UT) capsule, cholecalciferol (vitamin D3) 1,250 mcg (50,000 unit) capsule  take 1 capsule by mouth every week, Disp: , Rfl:     clopidogrel (Plavix) 75 mg tablet, Take 1 tablet (75 mg total) by mouth daily, Disp: 30 tablet, Rfl: 2    lisinopril-hydrochlorothiazide (PRINZIDE,ZESTORETIC) 20-12.5 MG per tablet, TAKE 2 TABLETS BY MOUTH EVERY DAY, Disp: 180 tablet, Rfl: 4    metoprolol succinate (TOPROL-XL) 25 mg 24 hr tablet, TAKE 1 TABLET (25 MG TOTAL) BY MOUTH IN THE MORNING., Disp: 90 tablet, Rfl: 4    pantoprazole (PROTONIX) 40 mg tablet, Take 40 mg by mouth daily, Disp: , Rfl:     predniSONE 5 mg tablet, Take 5 mg by mouth daily, Disp: , Rfl:     lisinopril (ZESTRIL) 20 mg tablet, Take 1 tablet (20 mg total) by mouth daily (Patient not taking: Reported on 2023), Disp: 90 tablet, Rfl: 4    predniSONE 10 mg tablet, 3 tabs daily for 3 days, 2 tabs daily for 3 days, 1 tab daily for 3 days (Patient not taking: Reported on 2024), Disp: 18 tablet, Rfl: 0    ALLERGIES:  No Known Allergies    ROS:  Review of Systems     Constitutional: Negative for fatigue, fever or loss of appetite.  "  HENT: Negative.    Respiratory: Negative for shortness of breath, dyspnea.    Cardiovascular: Negative for chest pain/tightness.   Gastrointestinal: Negative for abdominal pain, N/V.   Endocrine: Negative for cold/heat intolerance, unexplained weight loss/gain.   Genitourinary: Negative for flank pain, dysuria, hematuria.   Musculoskeletal: Positive for arthralgia   Skin: Negative for rash.    Neurological: Negative for numbness or tingling  Psychiatric/Behavioral: Negative for agitation.  _____________________________________________________  PHYSICAL EXAMINATION:    Blood pressure 129/73, pulse 67, height 5' 4\" (1.626 m), weight 62.6 kg (138 lb).    Constitutional: Oriented to person, place, and time. Appears well-developed and well-nourished. No distress.   HENT:   Head: Normocephalic.   Eyes: Conjunctivae are normal. Right eye exhibits no discharge. Left eye exhibits no discharge. No scleral icterus.   Cardiovascular: Normal rate.    Pulmonary/Chest: Effort normal.   Neurological: Alert and oriented to person, place, and time.   Skin: Skin is warm and dry. No rash noted. Not diaphoretic. No erythema. No pallor.   Psychiatric: Normal mood and affect. Behavior is normal. Judgment and thought content normal.      MUSCULOSKELETAL EXAMINATION:   Physical Exam  Ortho Exam    Left upper extremity is neurovascularly intact  Fingers are pink and mobile  Compartments are soft  Range of motion of the shoulders from 0 to 100 degrees of forward flexion and abduction  Brisk cap refill  Sensation intact  Rotator cuff strength 4 and half out of 5  Objective:  BP Readings from Last 1 Encounters:   01/26/24 129/73      Wt Readings from Last 1 Encounters:   01/26/24 62.6 kg (138 lb)        BMI:   Estimated body mass index is 23.69 kg/m² as calculated from the following:    Height as of this encounter: 5' 4\" (1.626 m).    Weight as of this encounter: 62.6 kg (138 lb).      PROCEDURES PERFORMED:  Large joint arthrocentesis: L " subacromial bursa  Universal Protocol:  Risks and benefits: risks, benefits and alternatives were discussed  Consent given by: patient  Patient understanding: patient states understanding of the procedure being performed  Site marked: the operative site was marked  Patient identity confirmed: verbally with patient  Supporting Documentation  Indications: pain   Procedure Details  Location: shoulder - L subacromial bursa  Preparation: Patient was prepped and draped in the usual sterile fashion  Needle size: 22 G  Ultrasound guidance: no  Approach: lateral  Medications administered: 4 mL bupivacaine 0.25 %; 80 mg triamcinolone acetonide 40 mg/mL    Patient tolerance: patient tolerated the procedure well with no immediate complications  Dressing:  Sterile dressing applied            Scribe Attestation      I,:   am acting as a scribe while in the presence of the attending physician.:       I,:   personally performed the services described in this documentation    as scribed in my presence.:

## 2024-02-26 ENCOUNTER — APPOINTMENT (OUTPATIENT)
Dept: LAB | Facility: CLINIC | Age: 64
End: 2024-02-26
Payer: COMMERCIAL

## 2024-02-26 ENCOUNTER — HOSPITAL ENCOUNTER (OUTPATIENT)
Dept: NON INVASIVE DIAGNOSTICS | Facility: HOSPITAL | Age: 64
Discharge: HOME | End: 2024-02-26
Payer: COMMERCIAL

## 2024-02-26 DIAGNOSIS — I73.9 PVD (PERIPHERAL VASCULAR DISEASE) WITH CLAUDICATION (HCC): ICD-10-CM

## 2024-02-26 DIAGNOSIS — E78.5 HYPERLIPIDEMIA, UNSPECIFIED HYPERLIPIDEMIA TYPE: ICD-10-CM

## 2024-02-26 DIAGNOSIS — Z72.0 TOBACCO ABUSE: ICD-10-CM

## 2024-02-26 DIAGNOSIS — I10 HYPERTENSION, UNSPECIFIED TYPE: ICD-10-CM

## 2024-02-26 LAB
ALBUMIN SERPL BCP-MCNC: 3.7 G/DL (ref 3.5–5)
ALP SERPL-CCNC: 60 U/L (ref 34–104)
ALT SERPL W P-5'-P-CCNC: 22 U/L (ref 7–52)
ANION GAP SERPL CALCULATED.3IONS-SCNC: 7 MMOL/L
AST SERPL W P-5'-P-CCNC: 21 U/L (ref 13–39)
BILIRUB SERPL-MCNC: 0.36 MG/DL (ref 0.2–1)
BUN SERPL-MCNC: 11 MG/DL (ref 5–25)
CALCIUM SERPL-MCNC: 8.9 MG/DL (ref 8.4–10.2)
CHLORIDE SERPL-SCNC: 103 MMOL/L (ref 96–108)
CHOLEST SERPL-MCNC: 219 MG/DL
CO2 SERPL-SCNC: 31 MMOL/L (ref 21–32)
CREAT SERPL-MCNC: 0.84 MG/DL (ref 0.6–1.3)
ERYTHROCYTE [DISTWIDTH] IN BLOOD BY AUTOMATED COUNT: 15.2 % (ref 11.6–15.1)
GFR SERPL CREATININE-BSD FRML MDRD: 74 ML/MIN/1.73SQ M
GLUCOSE P FAST SERPL-MCNC: 85 MG/DL (ref 65–99)
HCT VFR BLD AUTO: 34.1 % (ref 34.8–46.1)
HDLC SERPL-MCNC: 68 MG/DL
HGB BLD-MCNC: 12.5 G/DL (ref 11.5–15.4)
LDLC SERPL CALC-MCNC: 130 MG/DL (ref 0–100)
MCH RBC QN AUTO: 36 PG (ref 26.8–34.3)
MCHC RBC AUTO-ENTMCNC: 36.7 G/DL (ref 31.4–37.4)
MCV RBC AUTO: 98 FL (ref 82–98)
NONHDLC SERPL-MCNC: 151 MG/DL
PLATELET # BLD AUTO: 227 THOUSANDS/UL (ref 149–390)
PMV BLD AUTO: 13 FL (ref 8.9–12.7)
POTASSIUM SERPL-SCNC: 3.8 MMOL/L (ref 3.5–5.3)
PROT SERPL-MCNC: 6.3 G/DL (ref 6.4–8.4)
RBC # BLD AUTO: 3.47 MILLION/UL (ref 3.81–5.12)
SODIUM SERPL-SCNC: 141 MMOL/L (ref 135–147)
TRIGL SERPL-MCNC: 106 MG/DL
WBC # BLD AUTO: 5.86 THOUSAND/UL (ref 4.31–10.16)

## 2024-02-26 PROCEDURE — 80053 COMPREHEN METABOLIC PANEL: CPT

## 2024-02-26 PROCEDURE — 80061 LIPID PANEL: CPT

## 2024-02-26 PROCEDURE — 93923 UPR/LXTR ART STDY 3+ LVLS: CPT

## 2024-02-26 PROCEDURE — 85027 COMPLETE CBC AUTOMATED: CPT

## 2024-02-26 PROCEDURE — 93978 VASCULAR STUDY: CPT

## 2024-02-26 PROCEDURE — 93978 VASCULAR STUDY: CPT | Performed by: SURGERY

## 2024-03-07 DIAGNOSIS — I73.9 PVD (PERIPHERAL VASCULAR DISEASE) WITH CLAUDICATION (HCC): ICD-10-CM

## 2024-03-07 RX ORDER — CLOPIDOGREL BISULFATE 75 MG/1
75 TABLET ORAL DAILY
Qty: 30 TABLET | Refills: 2 | Status: SHIPPED | OUTPATIENT
Start: 2024-03-07

## 2024-03-08 ENCOUNTER — OFFICE VISIT (OUTPATIENT)
Dept: OBGYN CLINIC | Facility: CLINIC | Age: 64
End: 2024-03-08

## 2024-03-08 VITALS
DIASTOLIC BLOOD PRESSURE: 75 MMHG | WEIGHT: 138 LBS | SYSTOLIC BLOOD PRESSURE: 132 MMHG | HEART RATE: 60 BPM | BODY MASS INDEX: 23.56 KG/M2 | HEIGHT: 64 IN

## 2024-03-08 DIAGNOSIS — M75.82 TENDINITIS OF LEFT ROTATOR CUFF: ICD-10-CM

## 2024-03-08 DIAGNOSIS — S46.812D PARTIAL TEAR OF LEFT SUBSCAPULARIS TENDON, SUBSEQUENT ENCOUNTER: Primary | ICD-10-CM

## 2024-03-08 PROCEDURE — 99213 OFFICE O/P EST LOW 20 MIN: CPT | Performed by: ORTHOPAEDIC SURGERY

## 2024-03-08 NOTE — LETTER
March 8, 2024     Patient: Nancie Noyola  YOB: 1960  Date of Visit: 3/8/2024      To Whom it May Concern:    Nancie Noyola is under my professional care. Nancie was seen in my office on 3/8/2024. Nancie is cleared to return to work full duty without limitations or restrictions as of 3/8/2024.     If you have any questions or concerns, please don't hesitate to call.         Sincerely,          Sulaiman Trinh,         CC: No Recipients

## 2024-03-08 NOTE — PROGRESS NOTES
Assessment/Plan:   Diagnoses and all orders for this visit:    Partial tear of left subscapularis tendon, subsequent encounter    Tendinitis of left rotator cuff    Discussed with patient that today's physical exam is relatively benign. She demonstrates great range of motion and great strength. She can continue formal physical therapy until she reaches a plateau, but at this time she is recommended to discharge to [facility] for maintenance. She has been able to return to work without significant symptom exacerbation, therefore she is being provided documentation for return to work full duty without limitations or restrictions. At this time, there is no need for scheduled follow-up. However, should any questions or concerns arise she can contact the office to schedule an appointment for reexamination. Patient expresses understanding and is in agreement with this treatment plan.    The patient is doing quite well in regards to her left shoulder.  There is no pain.  There is full strength and full motion.  Therapy was stopped last week.  Continue home exercise program.  Follow-up on an as-needed basis.  If her condition changes, she would not hesitate to let us know    Subjective:   Patient ID: Nancie Hall Beck  1960     HPI  Patient is a 63 y.o. female who presents for follow-up evaluation of left intrasubstance subscapularis tear with rotator cuff tendinosis and bursitis. She was last seen regards this issue on 1/26/2024, at which time she was provided with a CS injection and encouraged to continue with formal physical therapy to work on range of motion. On today's presentation she states that she has minimal to no complaints regarding her left upper extremity. She has been able to return to work and most ADLs without symptom exacerbation. She denies any new onset bruising, swelling, numbness, tingling, or mechanical symptoms. She also denies any adverse reactions to the injection including fever, chills, headache,  nausea, dizziness, or malaise.    The following portions of the patient's history were reviewed and updated as appropriate:  Past medical history, past surgical history, Family history, social history, current medications and allergies    Past Medical History:   Diagnosis Date    Arthritis     Coronary artery disease     Hypercholesteremia     Hypertension        Past Surgical History:   Procedure Laterality Date    CORONARY STENT PLACEMENT  2002       History reviewed. No pertinent family history.    Social History     Socioeconomic History    Marital status: /Civil Union     Spouse name: None    Number of children: None    Years of education: None    Highest education level: None   Occupational History    None   Tobacco Use    Smoking status: Every Day     Current packs/day: 0.00     Types: Cigarettes, E-Cigarettes     Last attempt to quit: 2019     Years since quittin.9    Smokeless tobacco: Never    Tobacco comments:     Now vaping   Vaping Use    Vaping status: Never Used   Substance and Sexual Activity    Alcohol use: Yes     Comment: socially    Drug use: Never    Sexual activity: None   Other Topics Concern    None   Social History Narrative    None     Social Determinants of Health     Financial Resource Strain: Not on file   Food Insecurity: Not on file   Transportation Needs: Not on file   Physical Activity: Not on file   Stress: Not on file   Social Connections: Not on file   Intimate Partner Violence: Not on file   Housing Stability: Not on file         Current Outpatient Medications:     ALPRAZolam (XANAX) 1 mg tablet, alprazolam 1 mg tablet  take 1 tablet by mouth at bedtime if needed, Disp: , Rfl:     aspirin (ECOTRIN LOW STRENGTH) 81 mg EC tablet, Take 81 mg by mouth daily, Disp: , Rfl:     atorvastatin (LIPITOR) 40 mg tablet, Take 1 tablet (40 mg total) by mouth daily, Disp: 90 tablet, Rfl: 3    Cholecalciferol 125 MCG (5000 UT) capsule, cholecalciferol (vitamin D3) 1,250 mcg (50,000  "unit) capsule  take 1 capsule by mouth every week, Disp: , Rfl:     clopidogrel (PLAVIX) 75 mg tablet, take 1 tablet by mouth once daily, Disp: 30 tablet, Rfl: 2    lisinopril-hydrochlorothiazide (PRINZIDE,ZESTORETIC) 20-12.5 MG per tablet, TAKE 2 TABLETS BY MOUTH EVERY DAY, Disp: 180 tablet, Rfl: 4    metoprolol succinate (TOPROL-XL) 25 mg 24 hr tablet, TAKE 1 TABLET (25 MG TOTAL) BY MOUTH IN THE MORNING., Disp: 90 tablet, Rfl: 4    pantoprazole (PROTONIX) 40 mg tablet, Take 40 mg by mouth daily, Disp: , Rfl:     predniSONE 5 mg tablet, Take 5 mg by mouth daily, Disp: , Rfl:     lisinopril (ZESTRIL) 20 mg tablet, Take 1 tablet (20 mg total) by mouth daily (Patient not taking: Reported on 12/28/2023), Disp: 90 tablet, Rfl: 4    predniSONE 10 mg tablet, 3 tabs daily for 3 days, 2 tabs daily for 3 days, 1 tab daily for 3 days (Patient not taking: Reported on 1/26/2024), Disp: 18 tablet, Rfl: 0    No Known Allergies    Review of Systems   Constitutional:  Negative for chills, fever and unexpected weight change.   HENT:  Negative for hearing loss, nosebleeds and sore throat.    Eyes:  Negative for pain, redness and visual disturbance.   Respiratory:  Negative for cough, shortness of breath and wheezing.    Cardiovascular:  Negative for chest pain, palpitations and leg swelling.   Gastrointestinal:  Negative for abdominal pain, nausea and vomiting.   Endocrine: Negative for polydipsia and polyuria.   Genitourinary:  Negative for dysuria and hematuria.   Musculoskeletal:         As noted in HPI   Skin:  Negative for rash and wound.   Neurological:  Negative for dizziness, numbness and headaches.   Psychiatric/Behavioral:  Negative for decreased concentration and suicidal ideas. The patient is not nervous/anxious.         Objective:  /75   Pulse 60   Ht 5' 4\" (1.626 m)   Wt 62.6 kg (138 lb)   BMI 23.69 kg/m²     Ortho Exam  Left shoulder -   No anatomical deformity  Skin is warm and dry to touch with no signs of " erythema, ecchymosis, or infection  No soft tissue swelling or effusion noted  Minimal palpable crepitus with passive motion  Minimally TTP over rotator cuff insertion  ROM FF 0-170, ABD 0-170, ER 0-70, IR L3  MMT 4+/5 throughout  - glenohumeral instability appreciated on exam  - Neer's, - Short  - Speed's, - Yergason's  - empty can, - drop-arm, - belly press, - resisted external rotation, - lift-off  Demonstrates normal elbow, wrist, and finger motion  2+ distal radial pulse with brisk capillary refill to the fingers  Radial, median, and ulnar motor and sensory distributions intact  Sensation to light touch intact distally    Physical Exam  Vitals and nursing note reviewed.   Constitutional:       General: She is not in acute distress.     Appearance: She is well-developed.   HENT:      Head: Normocephalic and atraumatic.   Eyes:      Conjunctiva/sclera: Conjunctivae normal.   Cardiovascular:      Rate and Rhythm: Normal rate.   Pulmonary:      Effort: Pulmonary effort is normal.   Musculoskeletal:      Cervical back: Neck supple.   Skin:     General: Skin is warm and dry.      Capillary Refill: Capillary refill takes less than 2 seconds.   Neurological:      Mental Status: She is alert and oriented to person, place, and time.   Psychiatric:         Mood and Affect: Mood normal.         Behavior: Behavior normal.          Diagnostic Test Review:  No new imaging reviewed this visit    Procedures   No procedures performed this visit    Scribe Attestation      I,:  John Brooks am acting as a scribe while in the presence of the attending physician.:       I,:  Sulaiman Trinh, DO personally performed the services described in this documentation    as scribed in my presence.:

## 2024-03-19 ENCOUNTER — OFFICE VISIT (OUTPATIENT)
Dept: VASCULAR SURGERY | Facility: CLINIC | Age: 64
End: 2024-03-19
Payer: COMMERCIAL

## 2024-03-19 ENCOUNTER — TELEPHONE (OUTPATIENT)
Dept: VASCULAR SURGERY | Facility: CLINIC | Age: 64
End: 2024-03-19

## 2024-03-19 VITALS
HEIGHT: 64 IN | OXYGEN SATURATION: 99 % | HEART RATE: 63 BPM | TEMPERATURE: 97.6 F | DIASTOLIC BLOOD PRESSURE: 74 MMHG | WEIGHT: 144.6 LBS | SYSTOLIC BLOOD PRESSURE: 132 MMHG | BODY MASS INDEX: 24.69 KG/M2

## 2024-03-19 DIAGNOSIS — E78.00 HYPERCHOLESTEROLEMIA: ICD-10-CM

## 2024-03-19 DIAGNOSIS — I70.211 ATHEROSCLEROSIS OF NATIVE ARTERIES OF EXTREMITIES WITH INTERMITTENT CLAUDICATION, RIGHT LEG (HCC): Primary | ICD-10-CM

## 2024-03-19 DIAGNOSIS — I74.09 AORTOILIAC OCCLUSIVE DISEASE (HCC): ICD-10-CM

## 2024-03-19 DIAGNOSIS — I25.10 CORONARY ARTERY DISEASE INVOLVING NATIVE CORONARY ARTERY OF NATIVE HEART WITHOUT ANGINA PECTORIS: ICD-10-CM

## 2024-03-19 DIAGNOSIS — Z72.0 TOBACCO ABUSE: ICD-10-CM

## 2024-03-19 PROCEDURE — 99215 OFFICE O/P EST HI 40 MIN: CPT | Performed by: SURGERY

## 2024-03-19 RX ORDER — SODIUM CHLORIDE 9 MG/ML
75 INJECTION, SOLUTION INTRAVENOUS CONTINUOUS
OUTPATIENT
Start: 2024-03-19

## 2024-03-19 RX ORDER — NICOTINE 21 MG/24HR
1 PATCH, TRANSDERMAL 24 HOURS TRANSDERMAL EVERY 24 HOURS
COMMUNITY
Start: 2024-02-28

## 2024-03-19 NOTE — TELEPHONE ENCOUNTER
REMINDER: Under Reason For Call, comments MUST be formatted as:   (Surgeon's Initials) / (Procedure)      Special Instructions / FYI: CTA scheduled for 3/26/24 please schedule angiogram for at least a few days afterwards.    Consent: I certify that patient has signed, printed, timed, and dated their surgery consent.  I certify that the patient's LEGAL NAME and DATE OF BIRTH are written in the upper left corner on BOTH sides of the consent.  I certify that BOTH sides of the completed surgery consent have been scanned into the patient's Epic chart by myself on 3/19/2024.  Yes, I have LABELED the consent in Epic as Consent for Vascular Procedure.     For Surgical Clearances     Levels   1-3   ROUTE this encounter to [facility] Clearance Pool (AND)   [facility] Surgery Coordinator Pool     Level   4   ROUTE this encounter to [facility] Surgery Coordinator Pool       HYDRATION CLEARANCES   ONLY ROUTE TO  [facility] Clearance Pool       Yes, I have ROUTED this encounter to [facility] Surgery Coordinator and/or [facility] Clearance Pool.

## 2024-03-19 NOTE — PROGRESS NOTES
Assessment/Plan:    Pt is a 65 yo F w/ CAD s/p RCA stent '02, HTN, former smoker, HLD, aortoiliac occlusive disease, PAD w/ RLE claudication, R heel wound    Atherosclerosis of native arteries of extremities with intermittent claudication, right leg (HCC)  Aortoiliac occlusive disease (HCC)  -     IR abdominal aortagram; Future  -     Basic metabolic panel; Future  -     CBC and Platelet; Future  -     Protime-INR; Future  -     CTA abdominal w run off w wo contrast; Future  -R calf claudication at 1 block and R heel wound/crack  -reviewed LEADs which show R: 0.23/45/13 and L: 0.61/40/45 with monophasic B femoral waveforms and B SFA occlusion  -reviewed AOIL which shows B iliac disease R>L  -discussed findings and atherosclerotic disease and options for treatment; we will need to get a CTA w/ runoff for procedural planning but I do not want to delay her intervention because of her wound; I have ordered the CTA and will review prior to angiogram for stent sizing; discussed angiogram and risks and benefits; discussed need for multiple procedures to treat multiple levels of disease  -plan for B femoral access, B iliac angiogram and intervention  -labs    Tobacco abuse  -was 1/2PPD smoker  -quit since new years; currently using patches    Coronary artery disease involving native coronary artery of native heart without angina pectoris  -RCA stent '02 [place]    Hypercholesterolemia  Medications  -cont ASA/statin  -will stop plavix; I believe this was started by my office but there is no indication for this at this time    Other orders  -     Nursing communication Apply gown prior to procedure; Standing  -     Have Patient Void On Call to Procedure Room; Standing  -     Insert and Maintain IV; Standing  -     sodium chloride 0.9 % infusion      Operative Scheduling Information:    Hospital:  [facility]    Physician:  Me    Surgery: Bilateral iliac angiogram w/ intervention    Urgency:  Standard    Level:  Level 3: Outpatients  to be scheduled for elective surgery than can be delayed up to 4 weeks without reasonable expectation of detriment to patient    Case Length:  Normal    Post-op Bed:  Outpatient    OR Table:  IR    Equipment Needs:  Product/Implant: will need iliac size stents but not sure what size until CT scan is complete; please have [facility] rep there    Medication Instructions:  Aspirin:   Continue (do not hold)    Hydration:  No    Contrast Allergy:  no      Subjective:      Patient ID: Nancie Noyola is a 64 y.o. female.      Patient presents today to review AOIL with BRANDON's done 2/26/24. Pt reports claudication in right calf when ambulating 1/2 block denies rest pain and has  wounds to right heel that is scabbed over no d/c noted Pt reports having for over a month now.  Does not follow with podiatry or wound care. Pt is taking ASA 81mg, Atorvastatin and Plavix. Pt is a non smoker.     HPI:    Patient presents to review testing for PAD.    Patient complains of pain in the right calf with ambulation.  She can go about 1 block and then she has to stop.  She denies LLE symptoms.  Denies thigh/hip claudication.  Symptoms started about 1 year ago.  She has a deep crack in her R heel which hasn't healed.. She noticed this about 1.5 months ago.    Denies CP or SOB.  Has hx of CAD s/p stent ('02)    Takes ASA, atorvastatin, plavix    Quit smoking on 1/1/24. Was 1/2PPD.  Using patches, currently on 14mg patch.        The following portions of the patient's history were reviewed and updated as appropriate: allergies, current medications, past family history, past medical history, past social history, past surgical history, and problem list.    Review of Systems   Constitutional: Negative.    HENT: Negative.     Eyes: Negative.    Respiratory: Negative.     Cardiovascular: Negative.    Gastrointestinal: Negative.    Endocrine: Negative.    Genitourinary: Negative.    Musculoskeletal:  Positive for arthralgias.   Skin:  Positive for  "wound.   Allergic/Immunologic: Negative.    Neurological: Negative.    Hematological:  Bruises/bleeds easily.   Psychiatric/Behavioral: Negative.           Objective:      /74 (BP Location: Left arm, Patient Position: Sitting, Cuff Size: Standard)   Pulse 63   Temp 97.6 °F (36.4 °C)   Ht 5' 4\" (1.626 m)   Wt 65.6 kg (144 lb 9.6 oz)   SpO2 99%   BMI 24.82 kg/m²          Physical Exam  Cardiovascular:      Rate and Rhythm: Normal rate and regular rhythm.      Pulses:           Radial pulses are 2+ on the right side and 2+ on the left side.        Femoral pulses are 0 on the right side and 2+ on the left side.       Dorsalis pedis pulses are 0 on the right side and 0 on the left side.        Posterior tibial pulses are 0 on the right side and 0 on the left side.      Heart sounds: No murmur heard.     Comments: No carotid bruits B  Pulmonary:      Effort: No respiratory distress.      Breath sounds: No wheezing or rales.   Musculoskeletal:      Right lower leg: No edema.      Left lower leg: No edema.   Skin:     Comments: Deep R heel crack without drainage or surrounding erythema           I have reviewed and made appropriate changes to the review of systems input by the medical assistant.    Vitals:    03/19/24 1501   BP: 132/74   BP Location: Left arm   Patient Position: Sitting   Cuff Size: Standard   Pulse: 63   Temp: 97.6 °F (36.4 °C)   SpO2: 99%   Weight: 65.6 kg (144 lb 9.6 oz)   Height: 5' 4\" (1.626 m)       Patient Active Problem List   Diagnosis   • Coronary artery disease involving native coronary artery of native heart without angina pectoris   • Hypercholesterolemia   • Essential hypertension   • Tobacco abuse   • Atherosclerosis of native arteries of extremities with intermittent claudication, right leg (HCC)   • Aortoiliac occlusive disease (HCC)       Past Surgical History:   Procedure Laterality Date   • CORONARY STENT PLACEMENT  2002       History reviewed. No pertinent family " history.    Social History     Socioeconomic History   • Marital status: /Civil Union     Spouse name: Not on file   • Number of children: Not on file   • Years of education: Not on file   • Highest education level: Not on file   Occupational History   • Not on file   Tobacco Use   • Smoking status: Former     Current packs/day: 0.00     Types: Cigarettes, E-Cigarettes     Quit date: 2019     Years since quittin.9   • Smokeless tobacco: Never   • Tobacco comments:     Now vaping   Vaping Use   • Vaping status: Never Used   Substance and Sexual Activity   • Alcohol use: Yes     Comment: socially   • Drug use: Never   • Sexual activity: Not on file   Other Topics Concern   • Not on file   Social History Narrative   • Not on file     Social Determinants of Health     Financial Resource Strain: Not on file   Food Insecurity: Not on file   Transportation Needs: Not on file   Physical Activity: Not on file   Stress: Not on file   Social Connections: Not on file   Intimate Partner Violence: Not on file   Housing Stability: Not on file       No Known Allergies      Current Outpatient Medications:   •  ALPRAZolam (XANAX) 1 mg tablet, alprazolam 1 mg tablet  take 1 tablet by mouth at bedtime if needed, Disp: , Rfl:   •  aspirin (ECOTRIN LOW STRENGTH) 81 mg EC tablet, Take 81 mg by mouth daily, Disp: , Rfl:   •  atorvastatin (LIPITOR) 40 mg tablet, Take 1 tablet (40 mg total) by mouth daily, Disp: 90 tablet, Rfl: 3  •  Cholecalciferol 125 MCG (5000 UT) capsule, cholecalciferol (vitamin D3) 1,250 mcg (50,000 unit) capsule  take 1 capsule by mouth every week, Disp: , Rfl:   •  lisinopril-hydrochlorothiazide (PRINZIDE,ZESTORETIC) 20-12.5 MG per tablet, TAKE 2 TABLETS BY MOUTH EVERY DAY, Disp: 180 tablet, Rfl: 4  •  metoprolol succinate (TOPROL-XL) 25 mg 24 hr tablet, TAKE 1 TABLET (25 MG TOTAL) BY MOUTH IN THE MORNING., Disp: 90 tablet, Rfl: 4  •  nicotine (NICODERM CQ) 14 mg/24hr TD 24 hr patch, Place 1 patch on  the skin every 24 hours, Disp: , Rfl:   •  pantoprazole (PROTONIX) 40 mg tablet, Take 40 mg by mouth daily, Disp: , Rfl:   •  predniSONE 5 mg tablet, Take 5 mg by mouth daily, Disp: , Rfl:   •  lisinopril (ZESTRIL) 20 mg tablet, Take 1 tablet (20 mg total) by mouth daily (Patient not taking: Reported on 12/28/2023), Disp: 90 tablet, Rfl: 4  •  predniSONE 10 mg tablet, 3 tabs daily for 3 days, 2 tabs daily for 3 days, 1 tab daily for 3 days (Patient not taking: Reported on 1/26/2024), Disp: 18 tablet, Rfl: 0

## 2024-03-20 NOTE — TELEPHONE ENCOUNTER
Verified patient's insurance   CONFIRMED - Patient's insurance is [employer] [facility]  Is patient requesting a call when authorization has been obtained? Patient did not request a call.    Surgery Date: 4/10/24  Primary Surgeon: BOYD // Mihcaela Bracnh (NPI: [id number])  Assisting Surgeon: Not Applicable (N/A)  Facility: [facility] (Tax: [id number] / NPI: [id number])  Inpatient / Outpatient: Outpatient  Level: 3    Clearance Received: No clearance ordered.  Consent Received: Yes, scanned into Epic on 3/19/24.  Medication Hold / Last Dose:  No hold on ASA  IR Notified:  Emailed 3/20/24  Rep. Notified:  [facility] notified 3/20/24  Equipment Needs: will need iliac size stents but not sure what size until CT scan is complete  [facility] Lab Requested: Not Applicable (N/A)  Patient Contacted: 3/20/24    Diagnosis: I70.211  Procedure/ CPT Code(s): Angiogram // CPT: 08275, 07662, and 75077 // Procedure to take place in IR [Referral Based]    For varicose vein related procedures:   Last LEVDR: Not Applicable (N/A)  CEAP Classification: Not Applicable (N/A)  VCSS: Not Applicable (N/A)    Post Operative Date/ Time: 5/7/24 , 830am Todd with DoctorMichaela (NPI: [id number])     *Please review medication hold(s), PATs, and check H&P with patient.*  PATIENT WAS MAILED SURGERY/SHOWERING/DISCHARGE/COVID INSTRUCTIONS AFTER REVIEWING WITH THEM VIA PHONE CALL.

## 2024-03-21 NOTE — TELEPHONE ENCOUNTER
Authorization requirements reviewed.  Please refer to Referral number  [id number]  for case updates.

## 2024-03-26 ENCOUNTER — HOSPITAL ENCOUNTER (OUTPATIENT)
Dept: CT IMAGING | Facility: HOSPITAL | Age: 64
Discharge: HOME | End: 2024-03-26
Attending: SURGERY
Payer: COMMERCIAL

## 2024-03-26 ENCOUNTER — TELEPHONE (OUTPATIENT)
Dept: VASCULAR SURGERY | Facility: CLINIC | Age: 64
End: 2024-03-26

## 2024-03-26 ENCOUNTER — APPOINTMENT (OUTPATIENT)
Dept: LAB | Facility: HOSPITAL | Age: 64
End: 2024-03-26
Payer: COMMERCIAL

## 2024-03-26 DIAGNOSIS — I70.211 ATHEROSCLEROSIS OF NATIVE ARTERIES OF EXTREMITIES WITH INTERMITTENT CLAUDICATION, RIGHT LEG (HCC): ICD-10-CM

## 2024-03-26 DIAGNOSIS — E55.9 VITAMIN D2 DEFICIENCY: ICD-10-CM

## 2024-03-26 DIAGNOSIS — E78.5 HYPERLIPIDEMIA, UNSPECIFIED HYPERLIPIDEMIA TYPE: ICD-10-CM

## 2024-03-26 DIAGNOSIS — I10 HYPERTENSION, UNSPECIFIED TYPE: ICD-10-CM

## 2024-03-26 DIAGNOSIS — I74.09 AORTOILIAC OCCLUSIVE DISEASE (HCC): ICD-10-CM

## 2024-03-26 LAB
25(OH)D3 SERPL-MCNC: 27.9 NG/ML (ref 30–100)
ALBUMIN SERPL BCP-MCNC: 3.5 G/DL (ref 3.5–5)
ALP SERPL-CCNC: 68 U/L (ref 34–104)
ALT SERPL W P-5'-P-CCNC: 26 U/L (ref 7–52)
ANION GAP SERPL CALCULATED.3IONS-SCNC: 7 MMOL/L (ref 4–13)
AST SERPL W P-5'-P-CCNC: 24 U/L (ref 13–39)
BILIRUB SERPL-MCNC: 0.35 MG/DL (ref 0.2–1)
BUN SERPL-MCNC: 9 MG/DL (ref 5–25)
CALCIUM SERPL-MCNC: 8.8 MG/DL (ref 8.4–10.2)
CHLORIDE SERPL-SCNC: 95 MMOL/L (ref 96–108)
CHOLEST SERPL-MCNC: 135 MG/DL
CO2 SERPL-SCNC: 30 MMOL/L (ref 21–32)
CREAT SERPL-MCNC: 0.8 MG/DL (ref 0.6–1.3)
ERYTHROCYTE [DISTWIDTH] IN BLOOD BY AUTOMATED COUNT: 14.6 % (ref 11.6–15.1)
GFR SERPL CREATININE-BSD FRML MDRD: 78 ML/MIN/1.73SQ M
GLUCOSE P FAST SERPL-MCNC: 71 MG/DL (ref 65–99)
HCT VFR BLD AUTO: 31.5 % (ref 34.8–46.1)
HDLC SERPL-MCNC: 52 MG/DL
HGB BLD-MCNC: 11.2 G/DL (ref 11.5–15.4)
INR PPP: 0.92 (ref 0.84–1.19)
LDLC SERPL CALC-MCNC: 65 MG/DL (ref 0–100)
MCH RBC QN AUTO: 35.9 PG (ref 26.8–34.3)
MCHC RBC AUTO-ENTMCNC: 35.6 G/DL (ref 31.4–37.4)
MCV RBC AUTO: 101 FL (ref 82–98)
NONHDLC SERPL-MCNC: 83 MG/DL
PLATELET # BLD AUTO: 248 THOUSANDS/UL (ref 149–390)
PMV BLD AUTO: 11.3 FL (ref 8.9–12.7)
POTASSIUM SERPL-SCNC: 3.8 MMOL/L (ref 3.5–5.3)
PROT SERPL-MCNC: 6.6 G/DL (ref 6.4–8.4)
PROTHROMBIN TIME: 12.5 SECONDS (ref 11.6–14.5)
RBC # BLD AUTO: 3.12 MILLION/UL (ref 3.81–5.12)
SODIUM SERPL-SCNC: 132 MMOL/L (ref 135–147)
TRIGL SERPL-MCNC: 92 MG/DL
WBC # BLD AUTO: 3.72 THOUSAND/UL (ref 4.31–10.16)

## 2024-03-26 PROCEDURE — 85610 PROTHROMBIN TIME: CPT

## 2024-03-26 PROCEDURE — 82306 VITAMIN D 25 HYDROXY: CPT

## 2024-03-26 PROCEDURE — 80061 LIPID PANEL: CPT

## 2024-03-26 PROCEDURE — 80053 COMPREHEN METABOLIC PANEL: CPT

## 2024-03-26 PROCEDURE — 75635 CT ANGIO ABDOMINAL ARTERIES: CPT

## 2024-03-26 PROCEDURE — 36415 COLL VENOUS BLD VENIPUNCTURE: CPT

## 2024-03-26 PROCEDURE — 85027 COMPLETE CBC AUTOMATED: CPT

## 2024-03-26 RX ADMIN — IOHEXOL 120 ML: 350 INJECTION, SOLUTION INTRAVENOUS at 08:12

## 2024-03-26 NOTE — TELEPHONE ENCOUNTER
Rosalinda from radiology called to report significant findings on CTA done today. Pt saw Dr. Branch 3/19 (see note for details) and is scheduled for agram 4/10/24 and f/u ov w/ Dr. Branch 5/7/24.  Please advise if anything additional is needed. Thank you.

## 2024-03-26 NOTE — TELEPHONE ENCOUNTER
Reviewed.  Can we please forward the results of her CTA to her PCP so that they can address the nonvascular findings (liver hemangiomata and uterine mass/fibroid).

## 2024-03-26 NOTE — TELEPHONE ENCOUNTER
Nothing further needed from vascular standpoint. Please forward liver and uterine findings to patients PCP to make them aware.

## 2024-04-10 ENCOUNTER — HOSPITAL ENCOUNTER (OUTPATIENT)
Dept: INTERVENTIONAL RADIOLOGY/VASCULAR | Facility: HOSPITAL | Age: 64
Discharge: HOME | End: 2024-04-10
Attending: SURGERY
Payer: COMMERCIAL

## 2024-04-10 VITALS
HEIGHT: 64 IN | DIASTOLIC BLOOD PRESSURE: 66 MMHG | BODY MASS INDEX: 24.59 KG/M2 | WEIGHT: 144 LBS | OXYGEN SATURATION: 95 % | RESPIRATION RATE: 20 BRPM | TEMPERATURE: 98.3 F | SYSTOLIC BLOOD PRESSURE: 149 MMHG | HEART RATE: 57 BPM

## 2024-04-10 DIAGNOSIS — I70.211 ATHEROSCLEROSIS OF NATIVE ARTERIES OF EXTREMITIES WITH INTERMITTENT CLAUDICATION, RIGHT LEG (HCC): Primary | ICD-10-CM

## 2024-04-10 PROBLEM — S91.301A NON-HEALING OPEN WOUND OF RIGHT HEEL: Status: ACTIVE | Noted: 2024-04-10

## 2024-04-10 PROCEDURE — C1894 INTRO/SHEATH, NON-LASER: HCPCS

## 2024-04-10 PROCEDURE — 37228 PR REVSC OPN/PRQ TIB/PERO W/ANGIOPLASTY UNI: CPT | Performed by: SURGERY

## 2024-04-10 PROCEDURE — C1874 STENT, COATED/COV W/DEL SYS: HCPCS

## 2024-04-10 PROCEDURE — C1725 CATH, TRANSLUMIN NON-LASER: HCPCS

## 2024-04-10 PROCEDURE — C1769 GUIDE WIRE: HCPCS

## 2024-04-10 PROCEDURE — C1887 CATHETER, GUIDING: HCPCS

## 2024-04-10 PROCEDURE — 37228 HB TIB/PER REVASC W/TLA (AKA CPT37228): CPT

## 2024-04-10 PROCEDURE — 99153 MOD SED SAME PHYS/QHP EA: CPT

## 2024-04-10 PROCEDURE — 75625 CONTRAST EXAM ABDOMINL AORTA: CPT

## 2024-04-10 PROCEDURE — 76937 US GUIDE VASCULAR ACCESS: CPT | Performed by: SURGERY

## 2024-04-10 PROCEDURE — 37226 PR REVSC OPN/PRQ FEM/POP W/STNT/ANGIOP SM VSL: CPT | Performed by: SURGERY

## 2024-04-10 PROCEDURE — 99152 MOD SED SAME PHYS/QHP 5/>YRS: CPT

## 2024-04-10 PROCEDURE — 37220 HB ILIAC REVASC: CPT

## 2024-04-10 PROCEDURE — 75898 FOLLOW-UP ANGIOGRAPHY: CPT

## 2024-04-10 PROCEDURE — 75710 ARTERY X-RAYS ARM/LEG: CPT

## 2024-04-10 PROCEDURE — 37226 HB FEM/POPL REVASC W/STENT: CPT

## 2024-04-10 PROCEDURE — C1760 CLOSURE DEV, VASC: HCPCS

## 2024-04-10 PROCEDURE — C2623 CATH, TRANSLUMIN, DRUG-COAT: HCPCS

## 2024-04-10 PROCEDURE — 76937 US GUIDE VASCULAR ACCESS: CPT

## 2024-04-10 PROCEDURE — 37220 PR REVASCULARIZATION ILIAC ARTERY ANGIOP 1ST VSL: CPT | Performed by: SURGERY

## 2024-04-10 RX ORDER — HEPARIN SODIUM 1000 [USP'U]/ML
INJECTION, SOLUTION INTRAVENOUS; SUBCUTANEOUS AS NEEDED
Status: COMPLETED | OUTPATIENT
Start: 2024-04-10 | End: 2024-04-10

## 2024-04-10 RX ORDER — SODIUM CHLORIDE 9 MG/ML
75 INJECTION, SOLUTION INTRAVENOUS CONTINUOUS
Status: DISCONTINUED | OUTPATIENT
Start: 2024-04-10 | End: 2024-04-14 | Stop reason: HOSPADM

## 2024-04-10 RX ORDER — LIDOCAINE HYDROCHLORIDE 10 MG/ML
INJECTION, SOLUTION EPIDURAL; INFILTRATION; INTRACAUDAL; PERINEURAL AS NEEDED
Status: COMPLETED | OUTPATIENT
Start: 2024-04-10 | End: 2024-04-10

## 2024-04-10 RX ORDER — CLOPIDOGREL BISULFATE 75 MG/1
150 TABLET ORAL ONCE
Status: COMPLETED | OUTPATIENT
Start: 2024-04-10 | End: 2024-04-10

## 2024-04-10 RX ORDER — ACETAMINOPHEN 325 MG/1
TABLET ORAL
Status: COMPLETED
Start: 2024-04-10 | End: 2024-04-10

## 2024-04-10 RX ORDER — MIDAZOLAM HYDROCHLORIDE 2 MG/2ML
INJECTION, SOLUTION INTRAMUSCULAR; INTRAVENOUS AS NEEDED
Status: COMPLETED | OUTPATIENT
Start: 2024-04-10 | End: 2024-04-10

## 2024-04-10 RX ORDER — OXYCODONE HYDROCHLORIDE 5 MG/1
5 TABLET ORAL EVERY 4 HOURS PRN
Status: DISCONTINUED | OUTPATIENT
Start: 2024-04-10 | End: 2024-04-14 | Stop reason: HOSPADM

## 2024-04-10 RX ORDER — IODIXANOL 270 MG/ML
INJECTION, SOLUTION INTRAVASCULAR AS NEEDED
Status: COMPLETED | OUTPATIENT
Start: 2024-04-10 | End: 2024-04-10

## 2024-04-10 RX ORDER — ACETAMINOPHEN 325 MG/1
650 TABLET ORAL EVERY 4 HOURS PRN
Status: DISCONTINUED | OUTPATIENT
Start: 2024-04-10 | End: 2024-04-14 | Stop reason: HOSPADM

## 2024-04-10 RX ORDER — FENTANYL CITRATE 50 UG/ML
INJECTION, SOLUTION INTRAMUSCULAR; INTRAVENOUS AS NEEDED
Status: COMPLETED | OUTPATIENT
Start: 2024-04-10 | End: 2024-04-10

## 2024-04-10 RX ORDER — CLOPIDOGREL BISULFATE 75 MG/1
75 TABLET ORAL DAILY
Qty: 90 TABLET | Refills: 0 | Status: SHIPPED | OUTPATIENT
Start: 2024-04-10

## 2024-04-10 RX ADMIN — FENTANYL CITRATE 25 MCG: 50 INJECTION, SOLUTION INTRAMUSCULAR; INTRAVENOUS at 11:39

## 2024-04-10 RX ADMIN — ACETAMINOPHEN 650 MG: 325 TABLET ORAL at 14:21

## 2024-04-10 RX ADMIN — MIDAZOLAM 0.5 MG: 1 INJECTION INTRAMUSCULAR; INTRAVENOUS at 11:54

## 2024-04-10 RX ADMIN — HEPARIN SODIUM 5000 UNITS: 1000 INJECTION INTRAVENOUS; SUBCUTANEOUS at 11:23

## 2024-04-10 RX ADMIN — HEPARIN SODIUM 1000 UNITS: 1000 INJECTION INTRAVENOUS; SUBCUTANEOUS at 12:44

## 2024-04-10 RX ADMIN — MIDAZOLAM 0.5 MG: 1 INJECTION INTRAMUSCULAR; INTRAVENOUS at 11:27

## 2024-04-10 RX ADMIN — FENTANYL CITRATE 25 MCG: 50 INJECTION, SOLUTION INTRAMUSCULAR; INTRAVENOUS at 13:31

## 2024-04-10 RX ADMIN — FENTANYL CITRATE 25 MCG: 50 INJECTION, SOLUTION INTRAMUSCULAR; INTRAVENOUS at 12:43

## 2024-04-10 RX ADMIN — MIDAZOLAM 1 MG: 1 INJECTION INTRAMUSCULAR; INTRAVENOUS at 12:59

## 2024-04-10 RX ADMIN — MIDAZOLAM 0.5 MG: 1 INJECTION INTRAMUSCULAR; INTRAVENOUS at 11:38

## 2024-04-10 RX ADMIN — HEPARIN SODIUM 1000 UNITS: 1000 INJECTION INTRAVENOUS; SUBCUTANEOUS at 12:08

## 2024-04-10 RX ADMIN — CLOPIDOGREL BISULFATE 150 MG: 75 TABLET ORAL at 14:59

## 2024-04-10 RX ADMIN — FENTANYL CITRATE 25 MCG: 50 INJECTION, SOLUTION INTRAMUSCULAR; INTRAVENOUS at 10:46

## 2024-04-10 RX ADMIN — MIDAZOLAM 1 MG: 1 INJECTION INTRAMUSCULAR; INTRAVENOUS at 11:13

## 2024-04-10 RX ADMIN — FENTANYL CITRATE 25 MCG: 50 INJECTION, SOLUTION INTRAMUSCULAR; INTRAVENOUS at 11:27

## 2024-04-10 RX ADMIN — MIDAZOLAM 0.5 MG: 1 INJECTION INTRAMUSCULAR; INTRAVENOUS at 12:17

## 2024-04-10 RX ADMIN — FENTANYL CITRATE 50 MCG: 50 INJECTION, SOLUTION INTRAMUSCULAR; INTRAVENOUS at 13:00

## 2024-04-10 RX ADMIN — MIDAZOLAM 0.5 MG: 1 INJECTION INTRAMUSCULAR; INTRAVENOUS at 13:31

## 2024-04-10 RX ADMIN — FENTANYL CITRATE 50 MCG: 50 INJECTION, SOLUTION INTRAMUSCULAR; INTRAVENOUS at 11:14

## 2024-04-10 RX ADMIN — OXYCODONE HYDROCHLORIDE 5 MG: 5 TABLET ORAL at 14:20

## 2024-04-10 RX ADMIN — FENTANYL CITRATE 25 MCG: 50 INJECTION, SOLUTION INTRAMUSCULAR; INTRAVENOUS at 11:46

## 2024-04-10 RX ADMIN — MIDAZOLAM 0.5 MG: 1 INJECTION INTRAMUSCULAR; INTRAVENOUS at 11:46

## 2024-04-10 RX ADMIN — MIDAZOLAM 0.5 MG: 1 INJECTION INTRAMUSCULAR; INTRAVENOUS at 12:43

## 2024-04-10 RX ADMIN — FENTANYL CITRATE 25 MCG: 50 INJECTION, SOLUTION INTRAMUSCULAR; INTRAVENOUS at 11:54

## 2024-04-10 RX ADMIN — MIDAZOLAM 0.5 MG: 1 INJECTION INTRAMUSCULAR; INTRAVENOUS at 10:46

## 2024-04-10 RX ADMIN — SODIUM CHLORIDE 75 ML/HR: 0.9 INJECTION, SOLUTION INTRAVENOUS at 09:32

## 2024-04-10 RX ADMIN — IODIXANOL 120 ML: 270 INJECTION, SOLUTION INTRAVASCULAR at 12:13

## 2024-04-10 RX ADMIN — FENTANYL CITRATE 25 MCG: 50 INJECTION, SOLUTION INTRAMUSCULAR; INTRAVENOUS at 12:17

## 2024-04-10 RX ADMIN — LIDOCAINE HYDROCHLORIDE 10 ML: 10 INJECTION, SOLUTION EPIDURAL; INFILTRATION; INTRACAUDAL; PERINEURAL at 11:06

## 2024-04-10 NOTE — INTERIM OP NOTE
* No procedures listed *  Postoperative Note  PATIENT NAME: Nancie Noyola  : 1960  MRN: [id number]  [facility] - IR    Surgery Date: 4/10/2024    Preop Diagnosis:  Atherosclerosis with BLE claudication and nonhealing R heel wound    Procedure:  L CFA access w/ 6F sheath and Mynx closure (unsuccessful)  R EIA PTA w/ 6x80mm  and 7b456em Buckhorn balloons  R SFA/pop PTA w/ 5x200   R SFA/pop stents with 2g667ps Tosha, 3f803iy Tosha, and 9o283gu Tosha  R pop 1v215ee and 2y956rz Edgardo balloons    Surgeon: Michaela Branch MD    DAP: 65.4  Fluoro Time: 38.5 min  Contrast: 120cc    Estimated Blood Loss:   Minimal    Anesthesia Type:   Conscious sedation  1% lidocaine    Findings:   High grade R EIA stenosis, treated w/ DCB  R SFA longsegment occlusion treated w/ PTA and PRABHAKAR  R peroneal occlusion treated with PTA, partially successful  AT is the dominant runoff    2+ R DP and L DP signal at completion    Complications:   None      SIGNATURE: Michaela Branch MD   DATE: April 10, 2024   TIME: 2:00 PM      Vascular Quality Initiative - Peripheral Vascular Intervention     Urgency: Elective    Functional Status:  Restricted in physically strenuous activity but ambulatory and able to carry out work of a light or sedentary nature.   Ambulation: Amb = independently ambulatory    Leg Symptoms    Right: Ulcer/necrosis (gangrene): de maddie tissue loss due to peripheral arterial disease, not due to non-healing prior amputation       Tissue Loss Severity: Grade 2, Deep = deeper full thickness ulcer or necrosis (gangrene) on distal leg or foot with exposed bone, joint, or tendon, or shallow heel ulcer without involvement of the calcaneus (ie, major tissue loss: salvageable with 3 digital amputations or standard transmetatarsal amputation [TMA] plus skin coverage).     Infection: Grade 0, None = No symptoms or signs of infection.  Left: Moderate Claudication:  ischemic limb muscle pain that limits walking 1-2 blocks  (300-600 feet, or 1-2 football fields)    COVID Information  COVID Symptoms Pre-Procedure: none    Access   Number of Sites: 1     Access Site 1:     Side 1: Left    Site 1: Femoral Retrograde    Access Guidance 1:U/S    Largest Sheath Size 1: 6 Fr.    Closure Device 1: MynxGrip      Number of Closure Devices: 1     Closure Device Outcome: Closure device failed         Procedure  Fluoro Time: 38.5 minutes  Contrast Volume: Visipaque 120 ml  DAP: 65.4 Gy.cm2  CO2: no  Anticoagulant: Heparin  Protamine: No  If Creatinine is > 1.2 or missing, BLAYNE Prophylaxis IV saline     Treatment Details  Indication: Occlusive Disease,    Completion Assessment  Artery 1 treated:  External Iliac  Right               Outflow: SFA, PROF, POP: 2                  Was this Site previously treated?: No          TASC Grade: B          Total Treated Length: 15 cm          Total Occluded Length: 0 cm          Calcification: Focal (calcification on one side of artery < half length of lesion)          Number of Treatment types (Devices):   2           Device 1          Treatment Type: Plain Balloon         Device 2          Treatment Type: Special Balloon,  Drug Coated Balloon                Diameter: 6 mm          Length: 150 mm          Concomitant: None          Technical result: Successful (stenosis <=30%)      Artery 2 treated: SFA+Pop   Right               Outflow: AT,PT,Peroneal: 1                       Segments treated: P1+P2                    Was this Site previously treated?: No          TASC Grade: D          Total Treated Length: 35 cm           Total Occluded Length: 30 cm          Calcification: Focal (calcification on one side of artery < half length of lesion)          Number of Treatment types (Devices):   2           Device 1          Treatment Type: Plain Balloon         Device 2          Treatment Type: Stent,  Drug Eluting Stent        1m441jr Tosha, 2o494rg Tosha, and 9v213im Tosha          Concomitant: None           Technical result: Successful (stenosis <=30%)      Artery 3 treated: Peroneal  Right                     Was this Site previously treated?: No          TASC Grade: D          Total Treated Length: 30 cm           Total Occluded Length: 20 cm          Calcification: Focal (calcification on one side of artery < half length of lesion)          Number of Treatment types (Devices):   1           Device 1          Treatment Type: Plain Balloon          Concomitant: None          Technical result: Stenosis >30% or 10 mm Gradient  None    None     Post Procedure  Patient currently taking: Statin, Yes      Antiplatelet Medication, Yes    Procedure Complications: Yes   Complications:          Access Site Complications/Treatments: 1     Access Site 1:     Hematoma: Minor    Stenosis/Occlusion: No (patent)    Infection: No    Pseudoaneurysm: No    AV Fistula: No

## 2024-04-10 NOTE — NURSING NOTE
Patient's bedrest completed at 1750. No s/s bleeding. Assisted patient sitting up in bed to 45 degrees, 90 degrees, and sitting at side of bed over 25 minutes. Assisted patient to standing position and walking minimally around unit. No s/s bleeding noted. Patient reporting feeling well and ready for discharge. Reviewed discharge instructions with patient and patient's .

## 2024-04-10 NOTE — DISCHARGE INSTR - AVS FIRST PAGE
You have a new medication called plavix.  We gave you the first dose in the hospital.  Please start taking this tomorrow 4/11/24.    DISCHARGE INSTRUCTIONS  ARTERIOGRAM/ANGIOPLASTY/STENT    ACTIVITY: On the evening following the procedure, you should be mostly resting.  Someone should remain with you during the evening and overnight following the procedure.     On the day after your procedure, limit your activity to walking.  Avoid heavy lifting (no more than 15 lbs) for the first three days. Walking up steps and normal activities may be resumed as you feel ready.   You should not drive a car for at least two days following discharge from the hospital. You may ride in a car.   If you have any questions regarding a particular activity, please discuss with your doctor or nurse before you are discharged.    DIET:  Resume your normal diet.  Drink more water than usual for the next 24 hours.    PROCEDURE SITE: You may have a procedure site in your groin, arm, or foot.  You may have surgical glue at your procedure site.  The glue is used to cover the procedure site, assist in closure, and prevent contamination. This adhesive will darken and peel away on its own within one to two weeks. Do not pick at it.    You should shower daily.  Wash incision daily with soap and water, but do not rub or scrub the incision; rinse thoroughly and pat dry.  Do not bathe in a tub or swim for the first 2 week following your procedure or if you have any open wounds.  It is normal to have some bruising, swelling or discoloration around the procedure site.  IF increasing redness, pain, or a bulge develops, call our office immediately.    If present, you may remove the band-aid or “steri-strips” over your procedure site after two days.   If you notice any active bleeding at the site, apply pressure to the site and call our office ([phone]) or 606.    FOLLOW UP STUDIES:  Your doctor will discuss whether further treatments or follow-up  studies are necessary at your first post procedure visit.    FOLLOW UP APPOINTMENTS:  Making and keeping follow up appointments and ultrasound tests are important to your recovery.  If you have difficulty making it to or keeping your follow up appointments, call the office.    If you have increased pain, fever >101.5, increased drainage, redness or a bad smell at your surgery site, new coldness/numbness of your arm or leg, please call us immediately and GO directly to the ER.    PLEASE CALL THE OFFICE IF YOU HAVE ANY QUESTIONS  [phone]  [phone] [address], [address], [place] [address]  [address] [facility], [address], [place] [address]  [address] [place] [address]  [address] [place] [address] [address], [place] [address]  [address] [facility], [place] [address]  [address] [facility], 2nd Floor, [address] [address]  [address] [facility], [address], [place] [address]  [address] [facility], 1st Floor, [address] [address]  [address], [facility], [place] [address]  [address] [address]

## 2024-04-10 NOTE — DISCHARGE INSTRUCTIONS
ARTERIOGRAM    WHAT YOU SHOULD KNOW:   An angiogram is a procedure to look at arteries in your body. Arteries are the blood vessels that carry blood from your heart to your body.     AFTER YOU LEAVE:     Self-care:   Limit activity: Rest for the remainder of the day of your procedure.Have some one with you until the next morning. Keep your arm or leg straight as much as possible.Rest as much as possible, sitting lying or reclining. Walk only to go to the bathroom, to bed or to eat. If the angiogram catheter was put in your leg, use the stairs as little as possible. No driving for 24-48 hours. No heavy lifting, >10 lbs. Or strenuous activity for 48 hours.    Keep your wound clean and dry.  Remove band aid/ dressing tomorrow. You may shower 24 hours after your procedure. Shower and wash groin area or wrist area gently with soap and water: beginning tomorrow. Rinse and pat Dry. Apply new water seal band aid. Repeat this process for 5 days.  If there is any drainage from the puncture site, you should put on a clean bandage. No Powders, creams, lotions or antibiotic ointments for 5 days.  No tub baths, hot tubs or swimming for 5 days.    Watch for bleeding and bruising: It is normal to have a bruise and soreness where the angiogram catheter went in.  Medication: If your angiogram was performed to treat blockages in your leg arteries, it is strongly recommended that you take both an antiplatelet medication (like aspirin or Plavix) to prevent clotting AND a statin drug (like Lipitor or Crestor), even if you have normal cholesterol. If these drugs are not ordered for you please contact either your Vascular Surgery office or the Interventional Radiology Dept during normal daytime working hours. See Interventional Radiology telephone numbers below.  You Should Have Follow up with the vascular surgeon   call [phone] with questions  Diet:   You may resume your regular diet, Sips of flat soda will help with mild  nausea.  Drink more liquids than usual for the next 24 hours        IMMEDIATELY Contact Interventional Radiology at [phone] if any of the following occur:  If your bruise gets larger or if you notice any active bleeding. APPLY DIRECT PRESSURE TO THE BLEEDING SITE.   If you notice increased swelling or have increased pain at the puncture site   If you have any numbness or pain in the extremity of the puncture site   If that extremity seems cold or pale.    You have fever greater than 101  Persistent nausea or vomitting    Follow up with your primary healthcare provider  as directed: Write down your questions so you remember to ask them during your visits.           Procedural Sedation   WHAT YOU NEED TO KNOW:   Procedural sedation is medicine used during procedures to help you feel relaxed and calm. You will remember little to none of the procedure. After sedation you may feel tired, weak, or unsteady on your feet. You may also have trouble concentrating or short-term memory loss. These symptoms should go away in 24 hours or less.   DISCHARGE INSTRUCTIONS:   Call 911 or have someone else call for any of the following:   You have sudden trouble breathing.     You cannot be woken.     Contact Interventional Radiology at [phone]   PERRY PATIENTS: Contact Interventional Radiology at [phone] VIVIAN PATIENTS: Contact Interventional Radiology at [phone]) if any of the following occur:      You have a severe headache or dizziness.     Your heart is beating faster than usual.    You have a fever or chills.     Your skin is itchy, swollen, or you have a rash.     You have nausea or are vomiting for more than 8 hours after the procedure.      You have questions or concerns about your condition or care.  Self-care:   Have someone stay with you for 24 hours. This person can drive you to errands and help you do things around the house. This person can also watch for problems.      Rest and do quiet activities  for 24 hours. Do not exercise, ride a bike, or play sports. Stand up slowly to prevent dizziness and falls. Take short walks around the house with another person. Slowly return to your usual activities the next day.      Do not drive or use dangerous machines or tools for 24 hours. You may injure yourself or others. Examples include a lawnmower, saw, or drill. Do not return to work for 24 hours if you use dangerous machines or tools for work.      Do not make important decisions for 24 hours. For example, do not sign important papers or invest money.      Drink liquids as directed. Liquids help flush the sedation medicine out of your body. Ask how much liquid to drink each day and which liquids are best for you.      Eat small, frequent meals to prevent nausea and vomiting. Start with clear liquids such as juice or broth. If you do not vomit after clear liquids, you can eat your usual foods.      Do not drink alcohol or take medicines that make you drowsy. This includes medicines that help you sleep and anxiety medicines. Ask your healthcare provider if it is safe for you to take pain medicine.  Follow up with your healthcare provider as directed: Write down your questions so you remember to ask them during your visits. [facility]  Interventional Radiology  [phone]

## 2024-04-12 ENCOUNTER — NURSE TRIAGE (OUTPATIENT)
Age: 64
End: 2024-04-12

## 2024-04-12 NOTE — TELEPHONE ENCOUNTER
"Answer Assessment - Initial Assessment Questions  1. ONSET: \"When did the pain start?\"       S/p agram 4/10/24  2. LOCATION: \"Where is the pain located?\"      R Prox inner thigh, denies groin pain  3. PAIN: \"How bad is the pain?\"    (Scale 1-10; or mild, moderate, severe)    -  MILD (1-3): doesn't interfere with normal activities     -  MODERATE (4-7): interferes with normal activities (e.g., work or school) or awakens from sleep, limping     -  SEVERE (8-10): excruciating pain, unable to do any normal activities, unable to walk      7 if trying to stand from sitting position, 0 at rest, if walking 4  4. WORK OR EXERCISE: \"Has there been any recent work or exercise that involved this part of the body?\"       S/p agram 4/10  5. CAUSE: \"What do you think is causing the leg pain?\"      unsure  6. OTHER SYMPTOMS: \"Do you have any other symptoms?\" (e.g., chest pain, back pain, breathing difficulty, swelling, rash, fever, numbness, weakness)      No bruising or swelling in area of pain R leg,  no pain or lump in L groin    Protocols used: Leg Pain-ADULT-[place]    "

## 2024-04-12 NOTE — TELEPHONE ENCOUNTER
Reviewed. Recommend she wait to return to work until soreness in the thigh subsides. Notify the office if worsening and/or if she develops a lump at the groin

## 2024-04-12 NOTE — TELEPHONE ENCOUNTER
Contacted patient and informed her of information from JYOTI Bolivar.  Verbal understanding received.

## 2024-04-12 NOTE — TELEPHONE ENCOUNTER
Pt called s/p abrahan w/ intervention RLE 4/10/24.  She is ? When she may return to work.  Reviewed AVS and it does not mention RTW plan.  ? Pt when she would like to return to work.  Pt states s/p abrahan she has been having pain R inner thigh, see below for additional information.  Pt works in a [employer] and stands the entire time she works.  She would like to go back to work but not until pain R inner thigh has resolved. Pt was working up until time of procedure. Pt also ? When she may remove her bandage L groin, per AVS she may remove after 2 days, reviewed AVS directions w/ her.   F/u ov schduled 5/7/24.    Advised pt I would route concerns re: R inner thigh pain to our triage providers as long as request for recommendations re: RTW and we would get back to her.

## 2024-05-07 ENCOUNTER — OFFICE VISIT (OUTPATIENT)
Dept: VASCULAR SURGERY | Facility: CLINIC | Age: 64
End: 2024-05-07
Payer: COMMERCIAL

## 2024-05-07 VITALS
HEART RATE: 60 BPM | DIASTOLIC BLOOD PRESSURE: 68 MMHG | OXYGEN SATURATION: 99 % | BODY MASS INDEX: 24.69 KG/M2 | WEIGHT: 144.6 LBS | TEMPERATURE: 97.6 F | HEIGHT: 64 IN | SYSTOLIC BLOOD PRESSURE: 118 MMHG

## 2024-05-07 DIAGNOSIS — E78.00 HYPERCHOLESTEROLEMIA: ICD-10-CM

## 2024-05-07 DIAGNOSIS — I74.09 AORTOILIAC OCCLUSIVE DISEASE (HCC): ICD-10-CM

## 2024-05-07 DIAGNOSIS — Z72.0 TOBACCO ABUSE: ICD-10-CM

## 2024-05-07 DIAGNOSIS — S91.301A NON-HEALING OPEN WOUND OF RIGHT HEEL: ICD-10-CM

## 2024-05-07 DIAGNOSIS — I70.211 ATHEROSCLEROSIS OF NATIVE ARTERIES OF EXTREMITIES WITH INTERMITTENT CLAUDICATION, RIGHT LEG (HCC): Primary | ICD-10-CM

## 2024-05-07 PROCEDURE — 99215 OFFICE O/P EST HI 40 MIN: CPT | Performed by: SURGERY

## 2024-05-07 RX ORDER — CHOLECALCIFEROL (VITAMIN D3) 1250 MCG
1 CAPSULE ORAL WEEKLY
COMMUNITY
Start: 2024-04-03

## 2024-05-07 NOTE — PROGRESS NOTES
Assessment/Plan:    Pt is a 65 yo F w/ CAD s/p RCA stent '02, HTN, former smoker, HLD, aortoiliac occlusive disease, PAD w/ RLE claudication, R heel wound s/p RLE angiogram 4/10/24    Atherosclerosis of native arteries of extremities with intermittent claudication, right leg (HCC)  Aortoiliac occlusive disease (HCC)  Non-healing open wound of right heel  -     VAS ARTERIAL DUPLEX- LOWER LIMB BILATERAL; Future  -R calf claudication at 1 block and R heel wound/crack  -reviewed LEADs which show R: 0.23/45/13 and L: 0.61/40/45 with monophasic B femoral waveforms and B SFA occlusion  -reviewed AOIL which shows B iliac disease R>L  -reviewed CTA which shows iliac stenosis R>L, R SFA occlusion, pop stenosis, tibial disease  -now s/p RLE agram w/ R EIA PTA, R SFA PTA/stents, R pop PTA, R peroneal PTA, AT/per runoff  -all symptoms resolved and R heel wound is healed; no access site issues; LLE symptoms mild and not life-limiting; advised on RTC symptoms  -will get new baseline AOIL and LEADs in 3 mos  -f/u 1 year    Incidental CTA findings  -discussed with patient incidental findings on CTA including liver lesion with recommendation for MRI and uterine mass, most likely fibroid, recommending gyn f/u; advised to discuss this with her PCP and establish care with a gynecologist    Tobacco abuse  -was 1/2PPD smoker  -quit since new years; currently using patches  -still at 14mg; advised that she needs to cut this down and advised lozenges or gum for cravings after going down to 7mg; goal to be completely off nicotine soon    Coronary artery disease involving native coronary artery of native heart without angina pectoris  -RCA stent '02 [place]    Hypercholesterolemia  Medications  -cont ASA/statin  -will plan to continue plavix at least 2 months for new stents but continue until she is completely off of the nicotine patches    Subjective:      Patient ID: Nancie Noyola is a 64 y.o. female.      Patient presents today to review  "Rachid Iliac angiogram done 4/10/24 by Dr. Michaela Branch. Pt denies pain, numbness swelling groin incision sit dry and intact. Pt is taking ASA 81 mg, Atorvastatin and Plavix. Pt is a former smoker.     HPI:    Patient presents for followup after angiogram    Patient complains of pain in the right calf with ambulation.  She can go about 1 block and then she has to stop.  She denies LLE symptoms.  Denies thigh/hip claudication.  Symptoms started about 1 year ago.  She has a deep crack in her R heel which hasn't healed.. She noticed this about 1.5 months ago.    Now s/p intervention, she has complete resolution of her RLE symptoms.  Her heel wound is now healed.  Had initial pain in the R thigh but this resolved after a few days.  Denies calf claudication in the LLE.  She has some soreness in the L thigh/hip.  She was able to walk around a yard sale as much as she wanted without issues.  Denies access site issues.    Denies CP or SOB.  Has hx of CAD s/p stent ('02)    Takes ASA, atorvastatin, plavix.    Quit smoking on 1/1/24. Was 1/2PPD.  Using patches, currently on 14mg patch.        The following portions of the patient's history were reviewed and updated as appropriate: allergies, current medications, past family history, past medical history, past social history, past surgical history, and problem list.    Review of Systems   Constitutional: Negative.    HENT: Negative.     Eyes: Negative.    Respiratory: Negative.     Cardiovascular: Negative.    Gastrointestinal: Negative.    Endocrine: Negative.    Genitourinary: Negative.    Musculoskeletal:  Positive for arthralgias.   Skin: Negative.    Allergic/Immunologic: Negative.    Neurological: Negative.    Hematological:  Bruises/bleeds easily.   Psychiatric/Behavioral: Negative.           Objective:      /68 (BP Location: Right arm, Patient Position: Sitting, Cuff Size: Standard)   Pulse 60   Temp 97.6 °F (36.4 °C) (Temporal)   Ht 5' 4\" (1.626 m)   Wt 65.6 kg " "(144 lb 9.6 oz)   SpO2 99%   BMI 24.82 kg/m²          Physical Exam  Cardiovascular:      Rate and Rhythm: Normal rate and regular rhythm.      Pulses:           Radial pulses are 2+ on the right side and 2+ on the left side.        Dorsalis pedis pulses are 2+ on the right side and 0 on the left side.        Posterior tibial pulses are 0 on the right side and 0 on the left side.      Heart sounds: No murmur heard.     Comments: No carotid bruits B    L fem access site C/D/I, well healed, no ecchymosis or hematoma  Pulmonary:      Effort: No respiratory distress.      Breath sounds: No wheezing or rales.   Musculoskeletal:      Right lower leg: No edema.      Left lower leg: No edema.   Skin:     Comments: Prior deep R heel crack is now healed; no B foot wounds           I have reviewed and made appropriate changes to the review of systems input by the medical assistant.    Vitals:    05/07/24 0831   BP: 118/68   BP Location: Right arm   Patient Position: Sitting   Cuff Size: Standard   Pulse: 60   Temp: 97.6 °F (36.4 °C)   TempSrc: Temporal   SpO2: 99%   Weight: 65.6 kg (144 lb 9.6 oz)   Height: 5' 4\" (1.626 m)       Patient Active Problem List   Diagnosis   • Coronary artery disease involving native coronary artery of native heart without angina pectoris   • Hypercholesterolemia   • Essential hypertension   • Tobacco abuse   • Atherosclerosis of native arteries of extremities with intermittent claudication, right leg (HCC)   • Aortoiliac occlusive disease (HCC)       Past Surgical History:   Procedure Laterality Date   • CORONARY STENT PLACEMENT  2002   • IR ABDOMINAL AORTAGRAM  4/10/2024       History reviewed. No pertinent family history.    Social History     Socioeconomic History   • Marital status: /Civil Union     Spouse name: Not on file   • Number of children: Not on file   • Years of education: Not on file   • Highest education level: Not on file   Occupational History   • Not on file   Tobacco " Use   • Smoking status: Former     Current packs/day: 0.00     Types: Cigarettes, E-Cigarettes     Quit date: 2019     Years since quittin.1   • Smokeless tobacco: Never   • Tobacco comments:     Now vaping   Vaping Use   • Vaping status: Never Used   Substance and Sexual Activity   • Alcohol use: Not Currently     Comment: socially   • Drug use: Never   • Sexual activity: Not on file   Other Topics Concern   • Not on file   Social History Narrative   • Not on file     Social Determinants of Health     Financial Resource Strain: Not on file   Food Insecurity: Not on file   Transportation Needs: Not on file   Physical Activity: Not on file   Stress: Not on file   Social Connections: Not on file   Intimate Partner Violence: Not on file   Housing Stability: Not on file       No Known Allergies      Current Outpatient Medications:   •  ALPRAZolam (XANAX) 1 mg tablet, alprazolam 1 mg tablet  take 1 tablet by mouth at bedtime if needed, Disp: , Rfl:   •  aspirin (ECOTRIN LOW STRENGTH) 81 mg EC tablet, Take 81 mg by mouth daily, Disp: , Rfl:   •  atorvastatin (LIPITOR) 40 mg tablet, Take 1 tablet (40 mg total) by mouth daily, Disp: 90 tablet, Rfl: 3  •  Cholecalciferol (Vitamin D3) 1.25 MG (43177 UT) CAPS, Take 1 capsule by mouth once a week, Disp: , Rfl:   •  Cholecalciferol 125 MCG (5000 UT) capsule, cholecalciferol (vitamin D3) 1,250 mcg (50,000 unit) capsule  take 1 capsule by mouth every week, Disp: , Rfl:   •  clopidogrel (PLAVIX) 75 mg tablet, Take 1 tablet (75 mg total) by mouth daily, Disp: 90 tablet, Rfl: 0  •  lisinopril-hydrochlorothiazide (PRINZIDE,ZESTORETIC) 20-12.5 MG per tablet, TAKE 2 TABLETS BY MOUTH EVERY DAY, Disp: 180 tablet, Rfl: 4  •  metoprolol succinate (TOPROL-XL) 25 mg 24 hr tablet, TAKE 1 TABLET (25 MG TOTAL) BY MOUTH IN THE MORNING., Disp: 90 tablet, Rfl: 4  •  nicotine (NICODERM CQ) 14 mg/24hr TD 24 hr patch, Place 1 patch on the skin every 24 hours, Disp: , Rfl:   •  pantoprazole  (PROTONIX) 40 mg tablet, Take 40 mg by mouth daily, Disp: , Rfl:   •  predniSONE 5 mg tablet, Take 5 mg by mouth daily, Disp: , Rfl:   •  lisinopril (ZESTRIL) 20 mg tablet, Take 1 tablet (20 mg total) by mouth daily (Patient not taking: Reported on 5/7/2024), Disp: 90 tablet, Rfl: 4  •  predniSONE 10 mg tablet, 3 tabs daily for 3 days, 2 tabs daily for 3 days, 1 tab daily for 3 days (Patient not taking: Reported on 5/7/2024), Disp: 18 tablet, Rfl: 0

## 2024-05-30 ENCOUNTER — APPOINTMENT (OUTPATIENT)
Dept: LAB | Facility: CLINIC | Age: 64
End: 2024-05-30
Payer: COMMERCIAL

## 2024-05-30 DIAGNOSIS — D50.9 IRON DEFICIENCY ANEMIA, UNSPECIFIED IRON DEFICIENCY ANEMIA TYPE: ICD-10-CM

## 2024-05-30 DIAGNOSIS — E55.9 VITAMIN D DEFICIENCY: ICD-10-CM

## 2024-05-30 DIAGNOSIS — D51.9 ANEMIA DUE TO VITAMIN B12 DEFICIENCY, UNSPECIFIED B12 DEFICIENCY TYPE: ICD-10-CM

## 2024-05-30 DIAGNOSIS — I10 ESSENTIAL HYPERTENSION, MALIGNANT: ICD-10-CM

## 2024-05-30 LAB
25(OH)D3 SERPL-MCNC: 61.4 NG/ML (ref 30–100)
ALBUMIN SERPL BCP-MCNC: 3.7 G/DL (ref 3.5–5)
ALP SERPL-CCNC: 82 U/L (ref 34–104)
ALT SERPL W P-5'-P-CCNC: 30 U/L (ref 7–52)
ANION GAP SERPL CALCULATED.3IONS-SCNC: 10 MMOL/L (ref 4–13)
AST SERPL W P-5'-P-CCNC: 30 U/L (ref 13–39)
BILIRUB SERPL-MCNC: 0.33 MG/DL (ref 0.2–1)
BUN SERPL-MCNC: 16 MG/DL (ref 5–25)
CALCIUM SERPL-MCNC: 8.6 MG/DL (ref 8.4–10.2)
CHLORIDE SERPL-SCNC: 97 MMOL/L (ref 96–108)
CO2 SERPL-SCNC: 29 MMOL/L (ref 21–32)
CREAT SERPL-MCNC: 0.88 MG/DL (ref 0.6–1.3)
ERYTHROCYTE [DISTWIDTH] IN BLOOD BY AUTOMATED COUNT: 13.2 % (ref 11.6–15.1)
GFR SERPL CREATININE-BSD FRML MDRD: 70 ML/MIN/1.73SQ M
GLUCOSE SERPL-MCNC: 92 MG/DL (ref 65–140)
HCT VFR BLD AUTO: 32.9 % (ref 34.8–46.1)
HGB BLD-MCNC: 11.8 G/DL (ref 11.5–15.4)
MCH RBC QN AUTO: 35.3 PG (ref 26.8–34.3)
MCHC RBC AUTO-ENTMCNC: 35.9 G/DL (ref 31.4–37.4)
MCV RBC AUTO: 99 FL (ref 82–98)
PLATELET # BLD AUTO: 285 THOUSANDS/UL (ref 149–390)
PMV BLD AUTO: 12.2 FL (ref 8.9–12.7)
POTASSIUM SERPL-SCNC: 3.9 MMOL/L (ref 3.5–5.3)
PROT SERPL-MCNC: 7 G/DL (ref 6.4–8.4)
RBC # BLD AUTO: 3.34 MILLION/UL (ref 3.81–5.12)
SODIUM SERPL-SCNC: 136 MMOL/L (ref 135–147)
VIT B12 SERPL-MCNC: 1329 PG/ML (ref 180–914)
WBC # BLD AUTO: 4.67 THOUSAND/UL (ref 4.31–10.16)

## 2024-05-30 PROCEDURE — 85027 COMPLETE CBC AUTOMATED: CPT

## 2024-05-30 PROCEDURE — 80053 COMPREHEN METABOLIC PANEL: CPT

## 2024-05-30 PROCEDURE — 36415 COLL VENOUS BLD VENIPUNCTURE: CPT

## 2024-05-30 PROCEDURE — 82607 VITAMIN B-12: CPT

## 2024-05-30 PROCEDURE — 82306 VITAMIN D 25 HYDROXY: CPT

## 2024-06-04 ENCOUNTER — HOSPITAL ENCOUNTER (OUTPATIENT)
Dept: NON INVASIVE DIAGNOSTICS | Facility: HOSPITAL | Age: 64
Discharge: HOME | End: 2024-06-04
Attending: SURGERY
Payer: COMMERCIAL

## 2024-06-04 DIAGNOSIS — I74.09 AORTOILIAC OCCLUSIVE DISEASE (HCC): ICD-10-CM

## 2024-06-04 DIAGNOSIS — I70.211 ATHEROSCLEROSIS OF NATIVE ARTERIES OF EXTREMITIES WITH INTERMITTENT CLAUDICATION, RIGHT LEG (HCC): ICD-10-CM

## 2024-06-04 PROCEDURE — 93925 LOWER EXTREMITY STUDY: CPT

## 2024-06-04 PROCEDURE — 93923 UPR/LXTR ART STDY 3+ LVLS: CPT

## 2024-06-05 PROCEDURE — 93922 UPR/L XTREMITY ART 2 LEVELS: CPT | Performed by: SURGERY

## 2024-06-05 PROCEDURE — 93925 LOWER EXTREMITY STUDY: CPT | Performed by: SURGERY

## 2024-07-08 DIAGNOSIS — I70.211 ATHEROSCLEROSIS OF NATIVE ARTERIES OF EXTREMITIES WITH INTERMITTENT CLAUDICATION, RIGHT LEG (HCC): ICD-10-CM

## 2024-07-09 ENCOUNTER — TELEPHONE (OUTPATIENT)
Age: 64
End: 2024-07-09

## 2024-07-09 RX ORDER — CLOPIDOGREL BISULFATE 75 MG/1
75 TABLET ORAL DAILY
Qty: 100 TABLET | Refills: 1 | Status: SHIPPED | OUTPATIENT
Start: 2024-07-09

## 2024-07-09 NOTE — TELEPHONE ENCOUNTER
"Pt called to confirm she no longer needs to take Plavix.  Per  Doctor's note from 5/7/24 \"Hypercholesterolemia  Medications  -cont ASA/statin  -will plan to continue plavix at least 2 months for new stents but continue until she is completely off of the nicotine patches\"    Pt is pleased to report she quit smoking and d/c the nicotine patches approx 1-2 mos ago. Advised I would make Dr. Jennings aware of her progress.      "

## 2024-08-29 ENCOUNTER — HOSPITAL ENCOUNTER (OUTPATIENT)
Dept: NON INVASIVE DIAGNOSTICS | Facility: HOSPITAL | Age: 64
Discharge: HOME | End: 2024-08-29
Attending: SURGERY
Payer: COMMERCIAL

## 2024-08-29 DIAGNOSIS — I74.09 AORTOILIAC OCCLUSIVE DISEASE (HCC): ICD-10-CM

## 2024-08-29 DIAGNOSIS — I70.211 ATHEROSCLEROSIS OF NATIVE ARTERIES OF EXTREMITIES WITH INTERMITTENT CLAUDICATION, RIGHT LEG (HCC): ICD-10-CM

## 2024-08-29 PROCEDURE — 93978 VASCULAR STUDY: CPT

## 2024-08-29 PROCEDURE — 93978 VASCULAR STUDY: CPT | Performed by: SURGERY

## 2024-08-30 ENCOUNTER — TRANSCRIBE ORDERS (OUTPATIENT)
Dept: ADMINISTRATIVE | Facility: HOSPITAL | Age: 64
End: 2024-08-30

## 2024-08-30 DIAGNOSIS — I74.09 AORTOILIAC OCCLUSIVE DISEASE (HCC): Primary | ICD-10-CM

## 2024-11-25 ENCOUNTER — APPOINTMENT (OUTPATIENT)
Dept: LAB | Facility: CLINIC | Age: 64
End: 2024-11-25
Payer: COMMERCIAL

## 2024-11-25 DIAGNOSIS — E55.9 VITAMIN D DEFICIENCY: ICD-10-CM

## 2024-11-25 DIAGNOSIS — I15.9 SECONDARY HYPERTENSION: ICD-10-CM

## 2024-11-26 ENCOUNTER — APPOINTMENT (OUTPATIENT)
Dept: LAB | Facility: CLINIC | Age: 64
End: 2024-11-26
Payer: COMMERCIAL

## 2024-11-26 LAB
ALBUMIN SERPL BCG-MCNC: 3.6 G/DL (ref 3.5–5)
ALP SERPL-CCNC: 78 U/L (ref 34–104)
ALT SERPL W P-5'-P-CCNC: 31 U/L (ref 7–52)
ANION GAP SERPL CALCULATED.3IONS-SCNC: 8 MMOL/L (ref 4–13)
AST SERPL W P-5'-P-CCNC: 35 U/L (ref 13–39)
BILIRUB SERPL-MCNC: 0.28 MG/DL (ref 0.2–1)
BUN SERPL-MCNC: 12 MG/DL (ref 5–25)
CALCIUM SERPL-MCNC: 8.9 MG/DL (ref 8.4–10.2)
CHLORIDE SERPL-SCNC: 100 MMOL/L (ref 96–108)
CO2 SERPL-SCNC: 30 MMOL/L (ref 21–32)
CREAT SERPL-MCNC: 0.83 MG/DL (ref 0.6–1.3)
ERYTHROCYTE [DISTWIDTH] IN BLOOD BY AUTOMATED COUNT: 15.1 % (ref 11.6–15.1)
GFR SERPL CREATININE-BSD FRML MDRD: 75 ML/MIN/1.73SQ M
GLUCOSE P FAST SERPL-MCNC: 86 MG/DL (ref 65–99)
HCT VFR BLD AUTO: 32.3 % (ref 34.8–46.1)
HGB BLD-MCNC: 11 G/DL (ref 11.5–15.4)
MCH RBC QN AUTO: 32 PG (ref 26.8–34.3)
MCHC RBC AUTO-ENTMCNC: 34.1 G/DL (ref 31.4–37.4)
MCV RBC AUTO: 94 FL (ref 82–98)
PLATELET # BLD AUTO: 180 THOUSANDS/UL (ref 149–390)
POTASSIUM SERPL-SCNC: 3.8 MMOL/L (ref 3.5–5.3)
PROT SERPL-MCNC: 6.8 G/DL (ref 6.4–8.4)
RBC # BLD AUTO: 3.44 MILLION/UL (ref 3.81–5.12)
SODIUM SERPL-SCNC: 138 MMOL/L (ref 135–147)
WBC # BLD AUTO: 3.99 THOUSAND/UL (ref 4.31–10.16)

## 2024-11-26 PROCEDURE — 82306 VITAMIN D 25 HYDROXY: CPT

## 2024-11-26 PROCEDURE — 36415 COLL VENOUS BLD VENIPUNCTURE: CPT

## 2024-11-26 PROCEDURE — 85027 COMPLETE CBC AUTOMATED: CPT

## 2024-11-26 PROCEDURE — 80053 COMPREHEN METABOLIC PANEL: CPT

## 2024-11-27 LAB — 25(OH)D3 SERPL-MCNC: 60.4 NG/ML (ref 30–100)

## 2024-12-04 ENCOUNTER — APPOINTMENT (OUTPATIENT)
Dept: RADIOLOGY | Facility: CLINIC | Age: 64
End: 2024-12-04
Payer: COMMERCIAL

## 2024-12-04 DIAGNOSIS — M25.511 CHRONIC RIGHT SHOULDER PAIN: ICD-10-CM

## 2024-12-04 DIAGNOSIS — G89.29 CHRONIC RIGHT SHOULDER PAIN: ICD-10-CM

## 2024-12-04 PROCEDURE — 73030 X-RAY EXAM OF SHOULDER: CPT

## 2025-01-10 DIAGNOSIS — I70.211 ATHEROSCLEROSIS OF NATIVE ARTERIES OF EXTREMITIES WITH INTERMITTENT CLAUDICATION, RIGHT LEG (HCC): ICD-10-CM

## 2025-01-13 RX ORDER — CLOPIDOGREL BISULFATE 75 MG/1
75 TABLET ORAL DAILY
Qty: 90 TABLET | Refills: 1 | Status: SHIPPED | OUTPATIENT
Start: 2025-01-13

## 2025-02-26 ENCOUNTER — APPOINTMENT (OUTPATIENT)
Dept: LAB | Facility: CLINIC | Age: 65
End: 2025-02-26
Payer: COMMERCIAL

## 2025-02-26 DIAGNOSIS — I25.10 CARDIOVASCULAR DISEASE: ICD-10-CM

## 2025-02-26 PROCEDURE — 36415 COLL VENOUS BLD VENIPUNCTURE: CPT

## 2025-02-26 PROCEDURE — 84443 ASSAY THYROID STIM HORMONE: CPT

## 2025-02-26 PROCEDURE — 80061 LIPID PANEL: CPT

## 2025-02-27 LAB
CHOLEST SERPL-MCNC: 163 MG/DL (ref ?–200)
HDLC SERPL-MCNC: 47 MG/DL
LDLC SERPL CALC-MCNC: 101 MG/DL (ref 0–100)
TRIGL SERPL-MCNC: 75 MG/DL (ref ?–150)
TSH SERPL DL<=0.05 MIU/L-ACNC: 3.37 UIU/ML (ref 0.45–4.5)

## 2025-03-10 ENCOUNTER — HOSPITAL ENCOUNTER (OUTPATIENT)
Dept: NON INVASIVE DIAGNOSTICS | Facility: HOSPITAL | Age: 65
Discharge: HOME | End: 2025-03-10
Attending: SURGERY
Payer: COMMERCIAL

## 2025-03-10 DIAGNOSIS — I74.09 AORTOILIAC OCCLUSIVE DISEASE (HCC): ICD-10-CM

## 2025-03-10 PROCEDURE — 93978 VASCULAR STUDY: CPT

## 2025-03-11 PROCEDURE — 93978 VASCULAR STUDY: CPT | Performed by: SURGERY

## 2025-03-13 ENCOUNTER — TRANSCRIBE ORDERS (OUTPATIENT)
Dept: VASCULAR SURGERY | Facility: CLINIC | Age: 65
End: 2025-03-13

## 2025-03-13 ENCOUNTER — TELEPHONE (OUTPATIENT)
Dept: ADMINISTRATIVE | Facility: HOSPITAL | Age: 65
End: 2025-03-13

## 2025-03-13 DIAGNOSIS — I73.9 PAD (PERIPHERAL ARTERY DISEASE) (HCC): Primary | ICD-10-CM

## 2025-03-13 NOTE — TELEPHONE ENCOUNTER
Attempted to contact patient to schedule appointment(s) listed below.  Requested patient call [phone] to schedule appointment(s).    Patient's appointment(s) are due now.    Dopplers  [] Abdominal Aorta Iliac (AOIL)  [] Carotid (CV)   [] Celiac and/or Mesenteric  [] Endovascular Aortic Repair (EVAR)   [] Hemodialysis Access (HD)   [] Lower Limb Arterial (KURTIS)  [] Lower Limb Venous (LEV)  [] Lower Limb Venous Duplex with Reflux (LEVDR)  [] Renal Artery  [] Upper Limb Arterial (UEA)    [] Upper Limb Venous (UEV)              [] BRANDON and Waveform analysis     Advanced Imaging   [] CTA head/neck    [] CTA abdomen    [] CTA abdomen & pelvis    [] CT abdomen with/ without contrast  [] CT abdomen with contrast  [] CT abdomen without contrast    [] CT abdomen & pelvis with/ without contrast  [] CT abdomen & pelvis with contrast  [] CT abdomen & pelvis without contrast    Office Visit   [] New patient, patient last seen over 3 years ago  [] Risk factor modification (RFM)   [x] Follow up   [] Lost to follow up (LTFU)   Called patient & LMOM to schedule KURTIS bilat ultrasound & OV w/LMD  per consensus letter

## 2025-04-03 ENCOUNTER — HOSPITAL ENCOUNTER (OUTPATIENT)
Dept: NON INVASIVE DIAGNOSTICS | Facility: HOSPITAL | Age: 65
Discharge: HOME | End: 2025-04-03
Attending: SURGERY
Payer: COMMERCIAL

## 2025-04-03 ENCOUNTER — TELEPHONE (OUTPATIENT)
Age: 65
End: 2025-04-03

## 2025-04-03 DIAGNOSIS — I73.9 PAD (PERIPHERAL ARTERY DISEASE) (HCC): ICD-10-CM

## 2025-04-03 PROCEDURE — 93923 UPR/LXTR ART STDY 3+ LVLS: CPT

## 2025-04-03 PROCEDURE — 93925 LOWER EXTREMITY STUDY: CPT

## 2025-04-03 NOTE — TELEPHONE ENCOUNTER
Chart reviewed appears to be routine surveillance imaging.  Last seen in office 1 year ago  Has OV scheduled end of May.    Please call patient and confirm that she is not experiencing any symptoms of rest pain or wounds.  If not can follow-up in office as scheduled.

## 2025-04-03 NOTE — TELEPHONE ENCOUNTER
Received a call from the lab with KURTIS results. Preliminary results available for review  Right SFA stent is occluded  Occlusion in the distal posterior tibia artery in the right  BRANDON in ischemic range

## 2025-04-04 NOTE — TELEPHONE ENCOUNTER
Pt returned the call.  Pt denies any rest pain, numbness, tingling, temperature or color changes to her legs.  Pt denies any new wounds or open areas on her toes, feet or legs.  Pt does report a heavy sensation in her legs when when she walks long distances.      Advised pt to contact the office if she develops any symptoms mentioned above or if she has any further concerns.

## 2025-04-05 PROCEDURE — 93925 LOWER EXTREMITY STUDY: CPT | Performed by: SURGERY

## 2025-04-05 PROCEDURE — 93922 UPR/L XTREMITY ART 2 LEVELS: CPT | Performed by: SURGERY

## 2025-05-19 ENCOUNTER — TELEPHONE (OUTPATIENT)
Dept: OBGYN CLINIC | Facility: MEDICAL CENTER | Age: 65
End: 2025-05-19

## 2025-05-19 ENCOUNTER — PATIENT MESSAGE (OUTPATIENT)
Dept: OBGYN CLINIC | Facility: MEDICAL CENTER | Age: 65
End: 2025-05-19

## 2025-05-19 NOTE — TELEPHONE ENCOUNTER
I called   [facility]   [address] [address] [place] [address] [phone]  Patient was referred to our office for abnormal pap smear. Requested GYN record  gave them our fax number [phone]. They are going to fax her record.       Called patient to schedule her with Dr. Longo for Follow up / abnormal pap smear

## 2025-05-27 ENCOUNTER — OFFICE VISIT (OUTPATIENT)
Dept: VASCULAR SURGERY | Facility: CLINIC | Age: 65
End: 2025-05-27
Payer: COMMERCIAL

## 2025-05-27 ENCOUNTER — TELEPHONE (OUTPATIENT)
Dept: VASCULAR SURGERY | Facility: CLINIC | Age: 65
End: 2025-05-27

## 2025-05-27 VITALS
HEART RATE: 56 BPM | BODY MASS INDEX: 24.96 KG/M2 | WEIGHT: 146.2 LBS | DIASTOLIC BLOOD PRESSURE: 70 MMHG | OXYGEN SATURATION: 95 % | TEMPERATURE: 97.5 F | HEIGHT: 64 IN | RESPIRATION RATE: 16 BRPM | SYSTOLIC BLOOD PRESSURE: 160 MMHG

## 2025-05-27 DIAGNOSIS — E78.00 HYPERCHOLESTEROLEMIA: ICD-10-CM

## 2025-05-27 DIAGNOSIS — I70.211 ATHEROSCLEROSIS OF NATIVE ARTERIES OF EXTREMITIES WITH INTERMITTENT CLAUDICATION, RIGHT LEG (HCC): ICD-10-CM

## 2025-05-27 DIAGNOSIS — Z87.891 FORMER SMOKER: ICD-10-CM

## 2025-05-27 DIAGNOSIS — I74.09 AORTOILIAC OCCLUSIVE DISEASE (HCC): Primary | ICD-10-CM

## 2025-05-27 PROCEDURE — 99215 OFFICE O/P EST HI 40 MIN: CPT | Performed by: SURGERY

## 2025-05-27 RX ORDER — SODIUM CHLORIDE 9 MG/ML
75 INJECTION, SOLUTION INTRAVENOUS CONTINUOUS
OUTPATIENT
Start: 2025-05-27

## 2025-05-27 NOTE — TELEPHONE ENCOUNTER
Please schedule RLE angiogram, L femoral access  with Dr. Michaela Branch at the [facility] on July 9, 2025 for this patient     DX: I74.09  PH: [phone]  NKA  No Hydration   No Hold on ASA or Plavix  Consent scanned into media on 5-27-25  Order has been placed   Rep Vostu notified     Thanks Jocelin

## 2025-05-27 NOTE — TELEPHONE ENCOUNTER
Left message for patient to call us so that we can schedule her procedure with Dr. Jennings Doctor [facility]

## 2025-05-27 NOTE — PROGRESS NOTES
Name: Nancie Noyola      : 1960      MRN: [id number]  Encounter Provider: Michaela Branch MD  Encounter Date: 2025   Encounter department: [facility]  :  Assessment & Plan        History of Present Illness {?Quick Links Encounters * My Last Note * Last Note in Specialty * Snapshot * Since Last Visit * History :75454}  HPI  Nancie Noyola is a 65 y.o. female who presents ***  {History obtained from(Optional):95799}    Review of Systems  {Select to insert medical history sections (Optional):11270}     Objective {?Quick Links Trend Vitals * Enter New Vitals * Results Review * Timeline (Adult) * Labs * Imaging * Cardiology * Procedures * Lung Cancer Screening * Surgical eConsent :79378}  There were no vitals taken for this visit.     Physical Exam    {Administrative / Billing Section (Optional):33340}

## 2025-05-27 NOTE — TELEPHONE ENCOUNTER
REMINDER: Under Reason For Call, comments MUST be formatted as:   (Surgeon's Initials) / (Procedure)      Special Instructions/FYI/Dialysis Days: Agram    Clearances: No clearance    Consent: I certify that patient has signed, printed, timed, and dated their surgery consent.  I certify that the patient's LEGAL NAME and DATE OF BIRTH are written in the upper left corner on BOTH sides of the consent.  I certify that BOTH sides of the completed surgery consent have been scanned into the patient's Epic chart by myself on 5/27/2025.  Yes, I have LABELED the consent in Epic as Consent for Vascular Procedure.     For Surgical Clearances     Levels   1-3   ROUTE this encounter to [facility] Surgery Coordinator Pool     Level   4   ROUTE this encounter to [facility] Surgery Coordinator Pool       HYDRATION CLEARANCES   ONLY ROUTE TO  [facility] Surgery Coordinator Pool       Yes, I have ROUTED this encounter to [facility] Surgery Coordinator.

## 2025-05-27 NOTE — TELEPHONE ENCOUNTER
Verified patient's insurance   CONFIRMED - Patient's insurance is [employer] [facility]  Is patient requesting a call when authorization has been obtained? Patient did not request a call.    Surgery Date: 7-9-25  Primary Surgeon: BOYD // Michaela Branch (NPI: [id number])  Assisting Surgeon: Not Applicable (N/A)  Facility: [facility] (Tax: [id number] / NPI: [id number])  Inpatient / Outpatient: Outpatient  Level: 4    Clearance Received: No clearance ordered.  Consent Received: Yes, scanned into Epic on 5-27-25.  Medication Hold / Last Dose: Not Applicable (N/A)  IR Notified: yes  Rep. Notified: yed  Equipment Needs: Not Applicable (N/A)  Vas Lab Requested: Not Applicable (N/A)  Patient Contacted: 5-27-25    Lyft Ride: NO  Pickup Date/Time: Not Applicable (N/A)    Diagnosis: I74.09  Procedure/ CPT Code(s): Angiogram // CPT: 45518, 67837, and 04122  // Procedure to take place in IR [Referral Based]    For varicose vein related procedures:   Last LEVDR: Not Applicable (N/A)  CEAP Classification: Not Applicable (N/A)  VCSS: Not Applicable (N/A)  PICTURES: Not Applicable (N/A)    Post Operative Date/ Time: TBD     *Please review medication hold(s), PATs, and check H&P with patient.*  PATIENT WAS MAILED SURGERY/SHOWERING/DISCHARGE/COVID INSTRUCTIONS AFTER REVIEWING WITH THEM VIA PHONE CALL.

## 2025-05-28 ENCOUNTER — TELEPHONE (OUTPATIENT)
Age: 65
End: 2025-05-28

## 2025-05-28 NOTE — TELEPHONE ENCOUNTER
New patient calling to see if sooner appointment available.  Warm transfer to  for further assistance.

## 2025-06-11 ENCOUNTER — HOSPITAL ENCOUNTER (OUTPATIENT)
Dept: ULTRASOUND IMAGING | Facility: HOSPITAL | Age: 65
Discharge: HOME | End: 2025-06-11
Payer: COMMERCIAL

## 2025-06-11 DIAGNOSIS — Z86.018 PERSONAL HISTORY OF OTHER BENIGN NEOPLASM: ICD-10-CM

## 2025-06-11 PROCEDURE — 76856 US EXAM PELVIC COMPLETE: CPT

## 2025-06-11 PROCEDURE — 76830 TRANSVAGINAL US NON-OB: CPT

## 2025-06-24 ENCOUNTER — TELEPHONE (OUTPATIENT)
Age: 65
End: 2025-06-24

## 2025-06-24 NOTE — TELEPHONE ENCOUNTER
Caller: Nancie Noyola    Doctor: Dr. Michaela Branch    Reason for call: Patient is scheduled for Agram on 7/9/25. She states that there was a large uterine mass found on US pelvis and is scheduled for an MRI of the pelvis on 7/11/25.  She states she did discuss this with Dr Branch at her last office visit 5/27/25. At this point she would like to know if she should continue to have the Agram on 7/9/25 or wait until after the uterine mass issue is resolved.    Call back#: [phone]

## 2025-06-24 NOTE — TELEPHONE ENCOUNTER
Will send to Dr. Michaela Branch to review and advise if patient is ok to keep angiogram as scheduled or if it should be rescheduled.

## 2025-06-25 NOTE — TELEPHONE ENCOUNTER
Called and s/w Nancie, she would like to push the Agram back until the end of July or beginning of August. She wants to have the MRI done first and go from there.

## 2025-07-16 ENCOUNTER — HOSPITAL ENCOUNTER (OUTPATIENT)
Dept: MAMMOGRAPHY | Facility: HOSPITAL | Age: 65
Discharge: HOME | End: 2025-07-16
Attending: FAMILY MEDICINE
Payer: COMMERCIAL

## 2025-07-16 ENCOUNTER — APPOINTMENT (OUTPATIENT)
Dept: LAB | Facility: CLINIC | Age: 65
End: 2025-07-16
Payer: COMMERCIAL

## 2025-07-16 DIAGNOSIS — Z12.31 ENCOUNTER FOR SCREENING MAMMOGRAM FOR MALIGNANT NEOPLASM OF BREAST: ICD-10-CM

## 2025-07-16 PROCEDURE — 77063 BREAST TOMOSYNTHESIS BI: CPT

## 2025-07-16 PROCEDURE — 77067 SCR MAMMO BI INCL CAD: CPT

## 2025-07-17 ENCOUNTER — APPOINTMENT (OUTPATIENT)
Dept: LAB | Facility: CLINIC | Age: 65
End: 2025-07-17
Payer: COMMERCIAL

## 2025-07-17 DIAGNOSIS — N83.8 OVARIAN MASS, RIGHT: ICD-10-CM

## 2025-07-17 DIAGNOSIS — D21.9 FIBROID: ICD-10-CM

## 2025-07-17 PROCEDURE — 36415 COLL VENOUS BLD VENIPUNCTURE: CPT

## 2025-07-17 PROCEDURE — 80048 BASIC METABOLIC PNL TOTAL CA: CPT

## 2025-07-18 LAB
ANION GAP SERPL CALCULATED.3IONS-SCNC: 9 MMOL/L (ref 4–13)
BUN SERPL-MCNC: 12 MG/DL (ref 5–25)
CALCIUM SERPL-MCNC: 9 MG/DL (ref 8.4–10.2)
CHLORIDE SERPL-SCNC: 98 MMOL/L (ref 96–108)
CO2 SERPL-SCNC: 27 MMOL/L (ref 21–32)
CREAT SERPL-MCNC: 0.78 MG/DL (ref 0.6–1.3)
GFR SERPL CREATININE-BSD FRML MDRD: 80 ML/MIN/1.73SQ M
GLUCOSE P FAST SERPL-MCNC: 94 MG/DL (ref 65–99)
POTASSIUM SERPL-SCNC: 3.6 MMOL/L (ref 3.5–5.3)
SODIUM SERPL-SCNC: 134 MMOL/L (ref 135–147)

## 2025-07-23 ENCOUNTER — HOSPITAL ENCOUNTER (OUTPATIENT)
Dept: MRI IMAGING | Facility: HOSPITAL | Age: 65
Discharge: HOME | End: 2025-07-23
Payer: COMMERCIAL

## 2025-07-23 DIAGNOSIS — N83.8 OVARIAN MASS: ICD-10-CM

## 2025-07-23 PROCEDURE — 72197 MRI PELVIS W/O & W/DYE: CPT

## 2025-07-23 PROCEDURE — A9585 GADOBUTROL INJECTION: HCPCS

## 2025-07-23 RX ORDER — GADOBUTROL 604.72 MG/ML
6 INJECTION INTRAVENOUS
Status: COMPLETED | OUTPATIENT
Start: 2025-07-23 | End: 2025-07-23

## 2025-07-23 RX ADMIN — GADOBUTROL 6 ML: 604.72 INJECTION INTRAVENOUS at 15:28

## 2025-07-25 ENCOUNTER — OFFICE VISIT (OUTPATIENT)
Dept: URGENT CARE | Facility: CLINIC | Age: 65
End: 2025-07-25
Payer: COMMERCIAL

## 2025-07-25 VITALS
SYSTOLIC BLOOD PRESSURE: 108 MMHG | HEART RATE: 59 BPM | BODY MASS INDEX: 24.82 KG/M2 | TEMPERATURE: 97.9 F | OXYGEN SATURATION: 99 % | RESPIRATION RATE: 16 BRPM | DIASTOLIC BLOOD PRESSURE: 58 MMHG | WEIGHT: 144.6 LBS

## 2025-07-25 DIAGNOSIS — R11.0 NAUSEA: ICD-10-CM

## 2025-07-25 DIAGNOSIS — L23.7 POISON IVY DERMATITIS: Primary | ICD-10-CM

## 2025-07-25 PROCEDURE — 96372 THER/PROPH/DIAG INJ SC/IM: CPT | Performed by: NURSE PRACTITIONER

## 2025-07-25 PROCEDURE — S9083 URGENT CARE CENTER GLOBAL: HCPCS | Performed by: NURSE PRACTITIONER

## 2025-07-25 PROCEDURE — G0383 LEV 4 HOSP TYPE B ED VISIT: HCPCS | Performed by: NURSE PRACTITIONER

## 2025-07-25 RX ORDER — PREDNISONE 10 MG/1
TABLET ORAL
Qty: 24 TABLET | Refills: 0 | Status: SHIPPED | OUTPATIENT
Start: 2025-07-26

## 2025-07-25 RX ORDER — DEXAMETHASONE SODIUM PHOSPHATE 10 MG/ML
10 INJECTION, SOLUTION INTRAMUSCULAR; INTRAVENOUS ONCE
Status: COMPLETED | OUTPATIENT
Start: 2025-07-25 | End: 2025-07-25

## 2025-07-25 RX ORDER — ONDANSETRON 4 MG/1
4 TABLET, FILM COATED ORAL EVERY 8 HOURS PRN
Qty: 20 TABLET | Refills: 0 | Status: SHIPPED | OUTPATIENT
Start: 2025-07-25

## 2025-07-25 RX ADMIN — DEXAMETHASONE SODIUM PHOSPHATE 10 MG: 10 INJECTION, SOLUTION INTRAMUSCULAR; INTRAVENOUS at 11:52

## 2025-07-25 NOTE — PATIENT INSTRUCTIONS
You were given a shot of dexamethasone in the office.  Start the prednisone on 7/26/2025  You have been prescribed prednisone. Take with food. Do NOT take any NSAID products (motrin, ibuprofen, aleve, advil) while taking this medication.  You may take tylenol.     You are to use calamine lotion on the face. No hydrocortisone lotion.  Apply cool compresses to the face.  Take zyrtec at night.     Follow up with your PCP in 3-5 days  Go to the ED if symptoms worsen     Pt verbally instructed to stop daily prednisone and start the prednisone that was prescribed today tomorrow. She is instructed she may restart the 5mg after the taper is done.

## 2025-07-25 NOTE — PROGRESS NOTES
[facility]  Name: Nancie Noyola      : 1960      MRN: [id number]  Encounter Provider: JYOTI Leary  Encounter Date: 2025   Encounter department: [facility] [place]  :  Assessment & Plan  Poison ivy dermatitis    Orders:    dexamethasone (PF) (DECADRON) injection 10 mg    predniSONE 10 mg tablet; Take 5 tabs po x 2 days; 4 tabs po x 2 days; 3 tabs po x 1 day; 2 tabs po x 1 day. 1 tab po x 1 day. Do not start before 2025.    ondansetron (ZOFRAN) 4 mg tablet; Take 1 tablet (4 mg total) by mouth every 8 (eight) hours as needed for nausea or vomiting    Nausea    Orders:    ondansetron (ZOFRAN) 4 mg tablet; Take 1 tablet (4 mg total) by mouth every 8 (eight) hours as needed for nausea or vomiting        Patient Instructions  Follow up with PCP in 3-5 days.  Proceed to  ER if symptoms worsen.    If tests are performed, our office will contact you with results only if changes need to made to the care plan discussed with you at the visit. You can review your full results on [facility].      You were given a shot of dexamethasone in the office.  Start the prednisone on 2025  You have been prescribed prednisone. Take with food. Do NOT take any NSAID products (motrin, ibuprofen, aleve, advil) while taking this medication.  You may take tylenol.     You are to use calamine lotion on the face. No hydrocortisone lotion.  Apply cool compresses to the face.  Take zyrtec at night.     Follow up with your PCP in 3-5 days  Go to the ED if symptoms worsen     Pt verbally instructed to stop daily prednisone and start the prednisone that was prescribed today tomorrow. She is instructed she may restart the 5mg after the taper is done.      Chief Complaint:   Chief Complaint   Patient presents with    Poison Whitney     Poison on face and hands started Tuesday      History of Present Illness   This is a 65 year old female who comes to care now with poison ivy on her  face and b/l hands x 4 days.  She denies difficulty swallowing or breathing but states she is sick to her stomach with it.  She states she is on 5mg prednisone daily for RA.  Denies fevers, chills, n/v/d.    PMH is listed and reviewed.     Poison Ivy          Review of Systems   Constitutional: Negative.    HENT: Negative.     Eyes: Negative.    Respiratory: Negative.     Cardiovascular: Negative.    Gastrointestinal:  Positive for nausea.   Endocrine: Negative.    Genitourinary: Negative.    Musculoskeletal: Negative.    Skin:  Positive for rash.   Neurological: Negative.    Hematological: Negative.    Psychiatric/Behavioral: Negative.       Past Medical History   Past Medical History[1]  Past Surgical History[2]  Family History[3]  she reports that she quit smoking about 6 years ago. Her smoking use included cigarettes and e-cigarettes. She has never used smokeless tobacco. She reports that she does not currently use alcohol. She reports that she does not use drugs.  Current Outpatient Medications   Medication Instructions    ALPRAZolam (XANAX) 1 mg tablet     aspirin (ECOTRIN LOW STRENGTH) 81 mg, Daily    atorvastatin (LIPITOR) 40 mg, Oral, Daily    Cholecalciferol (Vitamin D3) 1.25 MG (88737 UT) CAPS 1 capsule, Weekly    Cholecalciferol 125 MCG (5000 UT) capsule     clopidogrel (PLAVIX) 75 mg, Oral, Daily    lisinopril (ZESTRIL) 20 mg, Oral, Daily    lisinopril-hydrochlorothiazide (PRINZIDE,ZESTORETIC) 20-12.5 MG per tablet TAKE 2 TABLETS BY MOUTH EVERY DAY    metoprolol succinate (TOPROL-XL) 25 mg, Oral, Daily    nicotine (NICODERM CQ) 14 mg/24hr TD 24 hr patch 1 patch, Every 24 hours    ondansetron (ZOFRAN) 4 mg, Oral, Every 8 hours PRN    pantoprazole (PROTONIX) 40 mg, Daily    predniSONE 10 mg tablet 3 tabs daily for 3 days, 2 tabs daily for 3 days, 1 tab daily for 3 days    [START ON 7/26/2025] predniSONE 10 mg tablet Take 5 tabs po x 2 days; 4 tabs po x 2 days; 3 tabs po x 1 day; 2 tabs po x 1 day. 1 tab  "po x 1 day.    predniSONE 5 mg, Daily   Allergies[4]     Objective   /58   Pulse 59   Temp 97.9 °F (36.6 °C)   Resp 16   Wt 65.6 kg (144 lb 9.6 oz)   SpO2 99%   BMI 24.82 kg/m²      Physical Exam  Vitals and nursing note reviewed.   Constitutional:       General: She is not in acute distress.     Appearance: Normal appearance. She is normal weight. She is not ill-appearing, toxic-appearing or diaphoretic.   HENT:      Head: Normocephalic and atraumatic.      Nose: Nose normal.      Mouth/Throat:      Mouth: Mucous membranes are moist.      Comments: No drooling. Able to maintain own airway.  Speaks in full sentences. No lip swelling.       Eyes:      Extraocular Movements: Extraocular movements intact.       Cardiovascular:      Rate and Rhythm: Normal rate and regular rhythm.      Pulses: Normal pulses.      Heart sounds: Normal heart sounds.   Pulmonary:      Effort: Pulmonary effort is normal.      Breath sounds: Normal breath sounds.     Musculoskeletal:      Cervical back: Normal range of motion and neck supple.     Skin:     General: Skin is warm and dry.      Capillary Refill: Capillary refill takes less than 2 seconds.      Findings: Rash present.      Comments: Patches of poison ivy on face with some swelling.   Redness and edema of b/l hands and fingers.        Neurological:      General: No focal deficit present.      Mental Status: She is alert and oriented to person, place, and time.     Psychiatric:         Mood and Affect: Mood normal.         Behavior: Behavior normal.         Thought Content: Thought content normal.         Judgment: Judgment normal.       Portions of the record may have been created with voice recognition software.  Occasional wrong word or \"sound a like\" substitutions may have occurred due to the inherent limitations of voice recognition software.  Read the chart carefully and recognize, using context, where substitutions have occurred.       [1]   Past Medical " History:  Diagnosis Date    Arthritis     Coronary artery disease     Hypercholesteremia     Hypertension    [2]   Past Surgical History:  Procedure Laterality Date    CORONARY STENT PLACEMENT  2002    IR ABDOMINAL AORTAGRAM  4/10/2024   [3]   Family History  Problem Relation Name Age of Onset    No Known Problems Mother      No Known Problems Maternal Grandmother      No Known Problems Maternal Grandfather      No Known Problems Paternal Grandmother      No Known Problems Paternal Grandfather     [4] No Known Allergies